# Patient Record
Sex: FEMALE | Race: WHITE | NOT HISPANIC OR LATINO | URBAN - METROPOLITAN AREA
[De-identification: names, ages, dates, MRNs, and addresses within clinical notes are randomized per-mention and may not be internally consistent; named-entity substitution may affect disease eponyms.]

---

## 2018-11-06 ENCOUNTER — INPATIENT (INPATIENT)
Facility: HOSPITAL | Age: 48
LOS: 19 days | Discharge: HOME | DRG: 885 | End: 2018-11-26
Attending: PSYCHIATRY & NEUROLOGY | Admitting: PSYCHIATRY & NEUROLOGY
Payer: COMMERCIAL

## 2018-11-06 VITALS
OXYGEN SATURATION: 100 % | DIASTOLIC BLOOD PRESSURE: 90 MMHG | HEIGHT: 61 IN | HEART RATE: 83 BPM | WEIGHT: 110.01 LBS | RESPIRATION RATE: 18 BRPM | TEMPERATURE: 98 F | SYSTOLIC BLOOD PRESSURE: 131 MMHG

## 2018-11-06 VITALS
SYSTOLIC BLOOD PRESSURE: 94 MMHG | HEART RATE: 83 BPM | DIASTOLIC BLOOD PRESSURE: 62 MMHG | RESPIRATION RATE: 16 BRPM | OXYGEN SATURATION: 100 % | TEMPERATURE: 98 F

## 2018-11-06 DIAGNOSIS — R69 ILLNESS, UNSPECIFIED: ICD-10-CM

## 2018-11-06 DIAGNOSIS — F32.9 MAJOR DEPRESSIVE DISORDER, SINGLE EPISODE, UNSPECIFIED: ICD-10-CM

## 2018-11-06 DIAGNOSIS — F29 UNSPECIFIED PSYCHOSIS NOT DUE TO A SUBSTANCE OR KNOWN PHYSIOLOGICAL CONDITION: ICD-10-CM

## 2018-11-06 LAB
ALBUMIN SERPL ELPH-MCNC: 3.6 G/DL — SIGNIFICANT CHANGE UP (ref 3.3–5)
ALP SERPL-CCNC: 71 U/L — SIGNIFICANT CHANGE UP (ref 30–120)
ALT FLD-CCNC: 78 U/L DA — HIGH (ref 10–60)
AMPHET UR-MCNC: NEGATIVE — SIGNIFICANT CHANGE UP
ANION GAP SERPL CALC-SCNC: 8 MMOL/L — SIGNIFICANT CHANGE UP (ref 5–17)
APAP SERPL-MCNC: <1 UG/ML — LOW (ref 10–30)
APPEARANCE UR: ABNORMAL
AST SERPL-CCNC: 65 U/L — HIGH (ref 10–40)
BACTERIA # UR AUTO: ABNORMAL
BARBITURATES UR SCN-MCNC: NEGATIVE — SIGNIFICANT CHANGE UP
BASOPHILS # BLD AUTO: 0.11 K/UL — SIGNIFICANT CHANGE UP (ref 0–0.2)
BASOPHILS NFR BLD AUTO: 1.4 % — SIGNIFICANT CHANGE UP (ref 0–2)
BENZODIAZ UR-MCNC: NEGATIVE — SIGNIFICANT CHANGE UP
BILIRUB SERPL-MCNC: 0.2 MG/DL — SIGNIFICANT CHANGE UP (ref 0.2–1.2)
BILIRUB UR-MCNC: NEGATIVE — SIGNIFICANT CHANGE UP
BUN SERPL-MCNC: 18 MG/DL — SIGNIFICANT CHANGE UP (ref 7–23)
CALCIUM SERPL-MCNC: 9.5 MG/DL — SIGNIFICANT CHANGE UP (ref 8.4–10.5)
CHLORIDE SERPL-SCNC: 100 MMOL/L — SIGNIFICANT CHANGE UP (ref 96–108)
CO2 SERPL-SCNC: 30 MMOL/L — SIGNIFICANT CHANGE UP (ref 22–31)
COCAINE METAB.OTHER UR-MCNC: NEGATIVE — SIGNIFICANT CHANGE UP
COLOR SPEC: YELLOW — SIGNIFICANT CHANGE UP
CREAT SERPL-MCNC: 1.29 MG/DL — SIGNIFICANT CHANGE UP (ref 0.5–1.3)
DIFF PNL FLD: ABNORMAL
EOSINOPHIL # BLD AUTO: 0.15 K/UL — SIGNIFICANT CHANGE UP (ref 0–0.5)
EOSINOPHIL NFR BLD AUTO: 1.9 % — SIGNIFICANT CHANGE UP (ref 0–6)
EPI CELLS # UR: SIGNIFICANT CHANGE UP
ETHANOL SERPL-MCNC: <3 MG/DL — SIGNIFICANT CHANGE UP (ref 0–3)
GLUCOSE SERPL-MCNC: 91 MG/DL — SIGNIFICANT CHANGE UP (ref 70–99)
GLUCOSE UR QL: NEGATIVE MG/DL — SIGNIFICANT CHANGE UP
HCG UR QL: NEGATIVE — SIGNIFICANT CHANGE UP
HCT VFR BLD CALC: 32.4 % — LOW (ref 34.5–45)
HGB BLD-MCNC: 10.8 G/DL — LOW (ref 11.5–15.5)
IMM GRANULOCYTES NFR BLD AUTO: 0.7 % — SIGNIFICANT CHANGE UP (ref 0–1.5)
KETONES UR-MCNC: NEGATIVE — SIGNIFICANT CHANGE UP
LEUKOCYTE ESTERASE UR-ACNC: ABNORMAL
LYMPHOCYTES # BLD AUTO: 2.13 K/UL — SIGNIFICANT CHANGE UP (ref 1–3.3)
LYMPHOCYTES # BLD AUTO: 26.3 % — SIGNIFICANT CHANGE UP (ref 13–44)
MCHC RBC-ENTMCNC: 30.9 PG — SIGNIFICANT CHANGE UP (ref 27–34)
MCHC RBC-ENTMCNC: 33.3 GM/DL — SIGNIFICANT CHANGE UP (ref 32–36)
MCV RBC AUTO: 92.8 FL — SIGNIFICANT CHANGE UP (ref 80–100)
METHADONE UR-MCNC: NEGATIVE — SIGNIFICANT CHANGE UP
MONOCYTES # BLD AUTO: 0.47 K/UL — SIGNIFICANT CHANGE UP (ref 0–0.9)
MONOCYTES NFR BLD AUTO: 5.8 % — SIGNIFICANT CHANGE UP (ref 2–14)
NEUTROPHILS # BLD AUTO: 5.18 K/UL — SIGNIFICANT CHANGE UP (ref 1.8–7.4)
NEUTROPHILS NFR BLD AUTO: 63.9 % — SIGNIFICANT CHANGE UP (ref 43–77)
NITRITE UR-MCNC: POSITIVE
NRBC # BLD: 0 /100 WBCS — SIGNIFICANT CHANGE UP (ref 0–0)
OPIATES UR-MCNC: NEGATIVE — SIGNIFICANT CHANGE UP
PCP SPEC-MCNC: SIGNIFICANT CHANGE UP
PCP UR-MCNC: NEGATIVE — SIGNIFICANT CHANGE UP
PH UR: 6.5 — SIGNIFICANT CHANGE UP (ref 5–8)
PLATELET # BLD AUTO: 405 K/UL — HIGH (ref 150–400)
POTASSIUM SERPL-MCNC: 3.3 MMOL/L — LOW (ref 3.5–5.3)
POTASSIUM SERPL-SCNC: 3.3 MMOL/L — LOW (ref 3.5–5.3)
PROT SERPL-MCNC: 6.8 G/DL — SIGNIFICANT CHANGE UP (ref 6–8.3)
PROT UR-MCNC: 15 MG/DL
RBC # BLD: 3.49 M/UL — LOW (ref 3.8–5.2)
RBC # FLD: 15.9 % — HIGH (ref 10.3–14.5)
RBC CASTS # UR COMP ASSIST: SIGNIFICANT CHANGE UP /HPF (ref 0–4)
SALICYLATES SERPL-MCNC: <3 MG/DL — SIGNIFICANT CHANGE UP (ref 2.8–20)
SODIUM SERPL-SCNC: 138 MMOL/L — SIGNIFICANT CHANGE UP (ref 135–145)
SP GR SPEC: 1.02 — SIGNIFICANT CHANGE UP (ref 1.01–1.02)
THC UR QL: NEGATIVE — SIGNIFICANT CHANGE UP
UROBILINOGEN FLD QL: NEGATIVE MG/DL — SIGNIFICANT CHANGE UP
WBC # BLD: 8.1 K/UL — SIGNIFICANT CHANGE UP (ref 3.8–10.5)
WBC # FLD AUTO: 8.1 K/UL — SIGNIFICANT CHANGE UP (ref 3.8–10.5)
WBC UR QL: ABNORMAL

## 2018-11-06 PROCEDURE — 93010 ELECTROCARDIOGRAM REPORT: CPT

## 2018-11-06 PROCEDURE — 70450 CT HEAD/BRAIN W/O DYE: CPT | Mod: 26

## 2018-11-06 PROCEDURE — 99285 EMERGENCY DEPT VISIT HI MDM: CPT

## 2018-11-06 RX ORDER — POTASSIUM CHLORIDE 20 MEQ
40 PACKET (EA) ORAL ONCE
Qty: 0 | Refills: 0 | Status: COMPLETED | OUTPATIENT
Start: 2018-11-06 | End: 2018-11-07

## 2018-11-06 RX ORDER — CEFUROXIME AXETIL 250 MG
250 TABLET ORAL EVERY 12 HOURS
Qty: 0 | Refills: 0 | Status: COMPLETED | OUTPATIENT
Start: 2018-11-06 | End: 2018-11-13

## 2018-11-06 NOTE — ED BEHAVIORAL HEALTH ASSESSMENT NOTE - ACTIVATING EVENTS/STRESSORS
Pending incarceration or homelessness/Current or pending isolation/Recent humiliation/shame/Recent losses

## 2018-11-06 NOTE — ED ADULT TRIAGE NOTE - CHIEF COMPLAINT QUOTE
patient is brought in by EMS from [facility], as per EMS, patient stated she likes to hurt herself. patient denies it in triage room. patient is brought in by EMS from hot, as per EMS, patient stated she likes to hurt herself. patient denies it in triage room.    noted band aids on her b/l neck, patient report she fell.

## 2018-11-06 NOTE — ED BEHAVIORAL HEALTH ASSESSMENT NOTE - RISK ASSESSMENT
Risk factors include recent suicidal statements, acute psychosocial stressors, limited social supports, not in psych treatment, and unable to engage in safety planning. At this time pt is at high imminent risk of harm and requires inpt hospitalization for safety and stabilization.

## 2018-11-06 NOTE — ED BEHAVIORAL HEALTH ASSESSMENT NOTE - SUICIDE RISK FACTORS
Perceived burden on family and others/Unable to engage in safety planning/Global insomnia/Mood episode/Agitation/severe anxiety
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
## 2018-11-06 NOTE — ED ADULT NURSE NOTE - CHIEF COMPLAINT QUOTE
patient is brought in by EMS from [facility], as per EMS, patient stated she likes to hurt herself. patient denies it in triage room.

## 2018-11-06 NOTE — ED BEHAVIORAL HEALTH ASSESSMENT NOTE - DESCRIPTION
Per nurse So- pt initially presented to the ED making bizarre statements, had flight of ideas, talking rapidly, verbally agitated, and demanding to leave. She states that after some time pt was able to calm down without PRNs or restraints utilized. She states that pt is AAOx4, conversing with staff and making her needs known. She states that pt wanted to make a phone call and again became agitated when told she cannot at this time, but was able to de-escalate on her own again.    UA +UTI  Utox and serum tox negative   TSH and head CT scan pending  Refused CXR due to concern for radiation    Vital Signs Last 24 Hrs  T(C): 36.7 (06 Nov 2018 17:59), Max: 36.8 (06 Nov 2018 16:30)  T(F): 98.1 (06 Nov 2018 17:59), Max: 98.3 (06 Nov 2018 16:30)  HR: 75 (06 Nov 2018 23:06) (63 - 83)  BP: 107/62 (06 Nov 2018 17:59) (107/62 - 131/90)  BP(mean): --  RR: 16 (06 Nov 2018 17:59) (16 - 18)  SpO2: 98% (06 Nov 2018 17:59) (98% - 100%) see HPI

## 2018-11-06 NOTE — ED BEHAVIORAL HEALTH ASSESSMENT NOTE - DETAILS
Patient is minimally cooperative with evaluation fatigue headache, photophobia will sign out in AM hotel staff aware see above Patient denies,  however she is guarded and minimizing of previous statements made to hotel staff which prompted 911 call

## 2018-11-06 NOTE — ED BEHAVIORAL HEALTH ASSESSMENT NOTE - MEDICAL ISSUES AND PLAN (INCLUDE STANDING AND PRN MEDICATION)
head CT with no acute abnormalities. F/u TSH and start synthroid as appropriate. Start Ceftin 250mg BID for 7 days for UTI. head CT with no acute abnormalities. K+ replacement as needed. F/u TSH and start synthroid as appropriate. Start Ceftin 250mg BID for 7 days for UTI. Start multivitamin.
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
## 2018-11-06 NOTE — ED BEHAVIORAL HEALTH ASSESSMENT NOTE - AXIS IV
Problem related to social environment/Economic problems/Problems with access to healthcare services/Problems with primary support/Housing problems/Occupational problems

## 2018-11-06 NOTE — ED BEHAVIORAL HEALTH ASSESSMENT NOTE - CURRENT MEDICATION
none- supposed to take synthroid but noncompliant none- supposed to take synthroid 175 mcg daily but noncompliant

## 2018-11-06 NOTE — ED ADULT NURSE NOTE - ED STAT RN HAND OFF
NUTRITION    Nutrition Screen      RECOMMENDATIONS / PLAN:     - Once patient is rewarmed, recommend starting enteral nutrition support. Start tube feeding of Glucerna 1.5 at 10 mL/hr and advance as tolerated by 10 mL q 6 hours to goal rate of 60 mL/hr with 150 mL q 6 hour water flushes and discontinue IV fluid once feedings at goal.   - Continue RD inpatient monitoring and evaluation. Goal Regimen: Glucerna 1.5 at 60 mL/hr + 150 mL q 6 hour water flushes to provide: 2160 kcal, 119 gm protein, 108 gm fat, 192 gm CHO, 23 gm fiber, 1093 mL free water, 100% RDIs     NUTRITION INTERVENTIONS & DIAGNOSIS:     [x] IV fluid: NS at 50 mL/hr    [x] Enteral nutrition support: start once rewarmed  [x] Vitamin/mineral supplementation: 1 gm Ca  [x] Coordination of care: discussed during interdisciplinary rounds     Nutrition Diagnosis: Inadequate oral intake related to inability to tolerate po as evidenced by NPO. ASSESSMENT:     7/21: Pt anticipated to reach goal rewarming temperature today, will start feedings once protocol completed per MD.   7/20: Pt intubated on hypothermia protocol- to start rewarming at 15:00 today. OGT to continuous suction.      Average po intake adequate to meet patients estimated nutritional needs:   [] Yes     [x] No   [] Unable to determine at this time    EN infusion adequate to meet patients estimated nutritional needs:   [] Yes     [x] No   [] Unable to determine at this time    Tube Feeding: Glucerna 1.5 at 10 mL/hr via OGT (plan to start once rewarmed)  Water Flushes: 150 mL q 6 hours (plan to start once rewarmed)  Residuals: OGT to continuous suction    Diet: DIET NPO      Food Allergies: NKFA  Current Appetite:   [] Good     [] Fair     [] Poor     [x] Other: NPO  Appetite/meal intake prior to admission:   [] Good     [] Fair     [] Poor     [x] Other: unknown   Feeding Limitations:  [] Swallowing difficulty    [] Chewing difficulty    [x] Other: intubated   Current Meal Intake: No data found.    BM: PTA   Skin Integrity: WDL  Edema: none   Pertinent Medications: Reviewed: insulin drip     Recent Labs      07/21/17   0620  07/20/17   2200  07/20/17   1700   NA  139  141  140   K  4.6  4.1  4.3   CL  107  110*  109*   CO2  21  21  22   GLU  196*  127*  206*   BUN  21*  20*  18   CREA  2.11*  1.63*  1.41*   CA  8.1*  8.4*  8.4*   MG  2.1  2.3  2.1   PHOS  3.9  3.4  2.7   ALB  2.9*  3.1*  3.2*   SGOT  173*  257*  319*   ALT  316*  386*  394*       Intake/Output Summary (Last 24 hours) at 07/21/17 0934  Last data filed at 07/21/17 0800   Gross per 24 hour   Intake          2517.53 ml   Output             1510 ml   Net          1007.53 ml       Anthropometrics:  Ht Readings from Last 1 Encounters:   07/19/17 6' 1\" (1.854 m)     Last 3 Recorded Weights in this Encounter    07/19/17 1005 07/20/17 0400   Weight: 97.1 kg (214 lb) 96.4 kg (212 lb 9.6 oz)     Body mass index is 28.05 kg/(m^2). Weight History:   Weight Metrics 7/20/2017 12/29/2016 8/9/2016 7/28/2016 3/31/2016 3/17/2016 2/5/2016   Weight 212 lb 9.6 oz 215 lb 219 lb 215 lb 218 lb 218 lb 228 lb   BMI 28.05 kg/m2 28.37 kg/m2 29.7 kg/m2 29.15 kg/m2 29.56 kg/m2 29.56 kg/m2 30.92 kg/m2        Admitting Diagnosis: Cardiac arrest [facility])  Pertinent PMHx: CKD, DM, HF    Education Needs:        [x] None identified  [] Identified - Not appropriate at this time  []  Identified and addressed - refer to education log  Learning Limitations:   [] None identified  [x] Identified: intubated     Cultural, Congregational & ethnic food preferences:  [x] None identified    [] Identified and addressed     ESTIMATED NUTRITION NEEDS:     Calories: 7645-9620 kcal ([id number]) based on  [x] Actual BW 96 kg     [] IBW   Protein: 115-192 gm (1.2-2 gm/kg) based on  [x] Actual BW      [] IBW   Fluid: 1 mL/kcal     MONITORING & EVALUATION:     Nutrition Goal(s): goal modified   1.  Nutritional needs will be met through adequate oral intake or nutrition support within the next 7 days.   Outcome:  [] Met/Ongoing    []  Not Met    [x] New/Initial Goal     Monitoring:   [x] EN tolerance   [x] EN infusion   [] Supplement intake   [x] GI symptoms/ability to tolerate po diet   [x] Respiratory status   [x] Plan of care      Previous Recommendations (for follow-up assessments only):     [x]   Implemented       []   Not Implemented (RD to address)     [] No Recommendation Made     Discharge Planning: pending ability to tolerate po and plan of care  [x] Participated in care planning, discharge planning, & interdisciplinary rounds as appropriate      Ann Lynn, [address]    Pager: [phone] Handoff

## 2018-11-06 NOTE — ED BEHAVIORAL HEALTH ASSESSMENT NOTE - PSYCHIATRIC ISSUES AND PLAN (INCLUDE STANDING AND PRN MEDICATION)
Patient refuses standing medications at this time. Primary inpt team to further discuss treatment options with patient. Ativan 2mg PO/IM q6h PRN severe agitation.

## 2018-11-06 NOTE — ED ADULT NURSE NOTE - NSIMPLEMENTINTERV_GEN_ALL_ED
Implemented All Universal Safety Interventions:  [place] to call system. Call bell, personal items and telephone within reach. Instruct patient to call for assistance. Room bathroom lighting operational. Non-slip footwear when patient is off stretcher. Physically safe environment: no spills, clutter or unnecessary equipment. Stretcher in lowest position, wheels locked, appropriate side rails in place.

## 2018-11-07 LAB — TSH SERPL-MCNC: 230.4 UIU/ML — HIGH (ref 0.27–4.2)

## 2018-11-07 PROCEDURE — 99233 SBSQ HOSP IP/OBS HIGH 50: CPT

## 2018-11-07 RX ORDER — ZOLPIDEM TARTRATE 10 MG/1
5 TABLET ORAL AT BEDTIME
Qty: 0 | Refills: 0 | Status: DISCONTINUED | OUTPATIENT
Start: 2018-11-07 | End: 2018-11-13

## 2018-11-07 RX ORDER — LEVOTHYROXINE SODIUM 125 MCG
137 TABLET ORAL DAILY
Qty: 0 | Refills: 0 | Status: DISCONTINUED | OUTPATIENT
Start: 2018-11-07 | End: 2018-11-07

## 2018-11-07 RX ORDER — LEVOTHYROXINE SODIUM 125 MCG
50 TABLET ORAL DAILY
Qty: 0 | Refills: 0 | Status: DISCONTINUED | OUTPATIENT
Start: 2018-11-08 | End: 2018-11-09

## 2018-11-07 RX ORDER — LEVOTHYROXINE SODIUM 125 MCG
25 TABLET ORAL DAILY
Qty: 0 | Refills: 0 | Status: DISCONTINUED | OUTPATIENT
Start: 2018-11-07 | End: 2018-11-07

## 2018-11-07 RX ORDER — ACETAMINOPHEN 500 MG
650 TABLET ORAL EVERY 6 HOURS
Qty: 0 | Refills: 0 | Status: DISCONTINUED | OUTPATIENT
Start: 2018-11-07 | End: 2018-11-22

## 2018-11-07 RX ORDER — LEVOTHYROXINE SODIUM 125 MCG
25 TABLET ORAL ONCE
Qty: 0 | Refills: 0 | Status: COMPLETED | OUTPATIENT
Start: 2018-11-07 | End: 2018-11-07

## 2018-11-07 RX ADMIN — Medication 250 MILLIGRAM(S): at 18:02

## 2018-11-07 RX ADMIN — Medication 25 MICROGRAM(S): at 18:37

## 2018-11-07 RX ADMIN — Medication 40 MILLIEQUIVALENT(S): at 04:28

## 2018-11-07 RX ADMIN — Medication 1 TABLET(S): at 08:50

## 2018-11-07 RX ADMIN — Medication 250 MILLIGRAM(S): at 06:20

## 2018-11-07 NOTE — PROGRESS NOTE BEHAVIORAL HEALTH - NSBHFUPIPCHARTREVFT_PSY_A_CORE
TSH noted to be 230.40 and patient noted to have prolonged QT.   Dr. Delgadillo aware, and Dr. Briceno to follow up with patient.   Abnormal UA, pt was started on Ceftin.

## 2018-11-07 NOTE — PROGRESS NOTE BEHAVIORAL HEALTH - NSBHADDHXPSYCHFT_PSY_A_CORE
patient denies, but Ms Howell reports patient has been declining over the past 2 weeks.  She reports patient has lived at the hospitals for 2 years and "was always a little eccentric" but has gotten worse recently, including making paranoid statements, and reporting hearing things (?AH).  She also reports patient has stated to hospitals staff that if she ran out of money, she would kill herself.  [facility] staff has had questions re: substance use, but have never seen any evidence of this.  Patient denies any hx of psychiatric tx either inpatient or outpatient

## 2018-11-07 NOTE — PROGRESS NOTE BEHAVIORAL HEALTH - NSBHFUPINTERVALHXFT_PSY_A_CORE
Patient is a 67yo MWF (in process of divorce), domiciled alone in a hotel for about 2 years, non caregiver, unemployed who was brought to ED by police due to reportedly telling hotel staff she was suicidal.  Met with and evaluated patient, and case discussed with staff.  No significant interval events are reported.  Patient is pleasant, and is taking care of her ADL this morning.  She denies any SI and reports she does not know why someone would say that.  She denies any substance use. Liver enzymes are slightly elevated. Patient is tangential at times, but participates in conversation.  She minimizes her recent behaviors in the hotel. She denies any SI or HI.  Lab values are significant:  TSH=230.40    UA is abnormal   Prolonged QT.  Patient denies any physical c/o

## 2018-11-07 NOTE — PROGRESS NOTE BEHAVIORAL HEALTH - NSBHADMITIPOBSFT_PSY_A_CORE
Patient is calm and in behavioral control.  Patient denies any SI or HI, and reports she will tell staff if she  has thoughts of harming self or others

## 2018-11-07 NOTE — PROGRESS NOTE BEHAVIORAL HEALTH - OTHER
medical issues reports she is not depressed mild constriction hotel staff reports paranoid statements, no delusions elicited today hotel staff reports some ?AH limited

## 2018-11-08 ENCOUNTER — APPOINTMENT (OUTPATIENT)
Dept: INTERNAL MEDICINE | Facility: CLINIC | Age: 48
End: 2018-11-08

## 2018-11-08 LAB — MAGNESIUM SERPL-MCNC: 2.3 MG/DL — SIGNIFICANT CHANGE UP (ref 1.6–2.6)

## 2018-11-08 PROCEDURE — 99233 SBSQ HOSP IP/OBS HIGH 50: CPT

## 2018-11-08 PROCEDURE — 93306 TTE W/DOPPLER COMPLETE: CPT | Mod: 26

## 2018-11-08 RX ORDER — ARIPIPRAZOLE 15 MG/1
5 TABLET ORAL DAILY
Qty: 0 | Refills: 0 | Status: DISCONTINUED | OUTPATIENT
Start: 2018-11-08 | End: 2018-11-09

## 2018-11-08 RX ORDER — DIPHENHYDRAMINE HCL 50 MG
50 CAPSULE ORAL EVERY 6 HOURS
Qty: 0 | Refills: 0 | Status: DISCONTINUED | OUTPATIENT
Start: 2018-11-08 | End: 2018-11-26

## 2018-11-08 RX ADMIN — Medication 1 TABLET(S): at 08:54

## 2018-11-08 RX ADMIN — Medication 50 MICROGRAM(S): at 06:25

## 2018-11-08 RX ADMIN — Medication 250 MILLIGRAM(S): at 08:54

## 2018-11-08 RX ADMIN — Medication 250 MILLIGRAM(S): at 21:04

## 2018-11-08 NOTE — PROGRESS NOTE BEHAVIORAL HEALTH - OTHER
reports she is not depressed but has constricted affect, at times odd mild constriction guarded in general; alludes to paranoid ideation cannot rule out at this time even though she denies looks older than stated age; some fillers/lip enhancement noted relates oddly and in a limited manner limited

## 2018-11-08 NOTE — PROGRESS NOTE BEHAVIORAL HEALTH - NSBHFUPINTERVALHXFT_PSY_A_CORE
Patient is adjusting to the Unit well thus far; attending group and she relates oddly and perseverates to the point that she has to be redirected. Patient endorses paranoid-themed thinking including not saying certain things "so they can't find me" and is unable to give a linear narrative and history. Her Synthroid was increased further from 25mcg po qd to 50mcg po qd given her hx of hypothyroidism, and very high TSH due to noncompliance. Patient is adjusting to the Unit well thus far; attending group and she relates oddly and in a limited manner. Patient perseverates to the point that she has to be redirected. Patient endorses paranoid-themed thinking including not saying certain things "so they can't find me" and is unable to give a linear narrative and history. Her Synthroid was increased further from 25mcg po qd to 50mcg po qd given her hx of hypothyroidism, and very high TSH due to noncompliance.

## 2018-11-08 NOTE — PROGRESS NOTE BEHAVIORAL HEALTH - NSBHADMITMEDEDUDETAILS_A_CORE FT
start Abilify 5mg PO qd (long half life so daily administration is preferred for otherwise unwilling patient; not too strong antipsychotic in case Pt is antipsychotic naive; also has mood stabilizing effect just in case there is a Bipolar spectrum disorder present in this case) + PRNs Psychoeducation done re: need for Rx compliance for sx management with patient saying "I don't need Rx"

## 2018-11-08 NOTE — PROGRESS NOTE BEHAVIORAL HEALTH - NSBHADMITIPOBSFT_PSY_A_CORE
thus far has been calm thus far has been calm..Denies any SI or HI and reports will tell staff if she has SI or HI

## 2018-11-08 NOTE — PROGRESS NOTE BEHAVIORAL HEALTH - OTHER
reports she is not depressed mild constriction hotel staff reports paranoid statements, no delusions elicited today hotel staff reports some ?AH limited looks older than stated age; some fillers/lip enhancement noted relates oddly and in a limited manner reports she is not depressed but has constricted affect, at times odd guarded in general; alludes to paranoid ideation cannot rule out at this time even though she denies

## 2018-11-08 NOTE — PROGRESS NOTE BEHAVIORAL HEALTH - NSBHFUPINTERVALHXFT_PSY_A_CORE
Met with and evaluated patient, and case discussed in tx team meeting.  No significant interval events are reported except patient is refusing Abilify.  She reports she does not want any psych Rx.  Patient is pleasant, and in behavioral control, but she is tangential, and has paranoid thoughts about her twin sister.  Patient describes being in leg cast as a child to straighten her legs and how difficult this was for her.  Denies any SI or HI.  No Rx SE are noted or reported. Met with and evaluated patient, and case discussed in tx team meeting.  No significant interval events are reported except patient is refusing Abilify.  She reports she does not want any psych Rx.  Patient is pleasant, and in behavioral control, but she is tangential, and has some ? paranoid thoughts about her twin sister.  Patient describes being in leg cast as a child to straighten her legs and how difficult this was for her.  Denies any SI or HI.  No Rx SE are noted or reported.

## 2018-11-08 NOTE — PROGRESS NOTE BEHAVIORAL HEALTH - NSBHADMITMEDEDUDETAILS_A_CORE FT
start Abilify 5mg PO qd (long half life so daily administration is preferred for otherwise unwilling patient; not too strong antipsychotic in case Pt is antipsychotic naive; also has mood stabilizing effect just in case there is a Bipolar spectrum disorder present in this case) + PRNs

## 2018-11-09 DIAGNOSIS — F31.9 BIPOLAR DISORDER, UNSPECIFIED: ICD-10-CM

## 2018-11-09 PROCEDURE — 99233 SBSQ HOSP IP/OBS HIGH 50: CPT

## 2018-11-09 PROCEDURE — 93010 ELECTROCARDIOGRAM REPORT: CPT

## 2018-11-09 RX ORDER — LEVOTHYROXINE SODIUM 125 MCG
75 TABLET ORAL DAILY
Qty: 0 | Refills: 0 | Status: DISCONTINUED | OUTPATIENT
Start: 2018-11-09 | End: 2018-11-13

## 2018-11-09 RX ORDER — OLANZAPINE 15 MG/1
5 TABLET, FILM COATED ORAL EVERY 6 HOURS
Qty: 0 | Refills: 0 | Status: DISCONTINUED | OUTPATIENT
Start: 2018-11-09 | End: 2018-11-26

## 2018-11-09 RX ORDER — OLANZAPINE 15 MG/1
5 TABLET, FILM COATED ORAL EVERY 6 HOURS
Qty: 0 | Refills: 0 | Status: DISCONTINUED | OUTPATIENT
Start: 2018-11-09 | End: 2018-11-09

## 2018-11-09 RX ORDER — DIPHENHYDRAMINE HCL 50 MG
50 CAPSULE ORAL EVERY 6 HOURS
Qty: 0 | Refills: 0 | Status: DISCONTINUED | OUTPATIENT
Start: 2018-11-09 | End: 2018-11-26

## 2018-11-09 RX ORDER — LACTOBACILLUS ACIDOPHILUS 100MM CELL
1 CAPSULE ORAL DAILY
Qty: 0 | Refills: 0 | Status: DISCONTINUED | OUTPATIENT
Start: 2018-11-09 | End: 2018-11-26

## 2018-11-09 RX ORDER — ARIPIPRAZOLE 15 MG/1
5 TABLET ORAL DAILY
Qty: 0 | Refills: 0 | Status: DISCONTINUED | OUTPATIENT
Start: 2018-11-09 | End: 2018-11-12

## 2018-11-09 RX ORDER — DIVALPROEX SODIUM 500 MG/1
250 TABLET, DELAYED RELEASE ORAL
Qty: 0 | Refills: 0 | Status: DISCONTINUED | OUTPATIENT
Start: 2018-11-09 | End: 2018-11-15

## 2018-11-09 RX ORDER — CHOLECALCIFEROL (VITAMIN D3) 125 MCG
400 CAPSULE ORAL DAILY
Qty: 0 | Refills: 0 | Status: DISCONTINUED | OUTPATIENT
Start: 2018-11-09 | End: 2018-11-26

## 2018-11-09 RX ADMIN — Medication 250 MILLIGRAM(S): at 09:55

## 2018-11-09 RX ADMIN — Medication 2 MILLIGRAM(S): at 14:06

## 2018-11-09 RX ADMIN — DIVALPROEX SODIUM 250 MILLIGRAM(S): 500 TABLET, DELAYED RELEASE ORAL at 20:43

## 2018-11-09 RX ADMIN — Medication 650 MILLIGRAM(S): at 06:50

## 2018-11-09 RX ADMIN — Medication 1 TABLET(S): at 16:39

## 2018-11-09 RX ADMIN — Medication 5 MILLIGRAM(S): at 20:43

## 2018-11-09 RX ADMIN — Medication 250 MILLIGRAM(S): at 20:43

## 2018-11-09 RX ADMIN — ARIPIPRAZOLE 5 MILLIGRAM(S): 15 TABLET ORAL at 15:40

## 2018-11-09 RX ADMIN — Medication 50 MICROGRAM(S): at 05:09

## 2018-11-09 RX ADMIN — Medication 650 MILLIGRAM(S): at 05:50

## 2018-11-09 RX ADMIN — Medication 1 TABLET(S): at 09:55

## 2018-11-09 NOTE — PROGRESS NOTE BEHAVIORAL HEALTH - NSBHADMITMEDEDUDETAILS_A_CORE FT
as per Patient's request, ordered vitamins and supplements; encouraged her to take Abilify. Plan to go for medication Over Objection if Patient continues to refuse treatment. as per Patient's request, ordered vitamins and supplements; due to EKG QTC - holding antipsychotics for now; repeat EKG ordered. encouraged her to take depakote 250mg PO bid. Plan to go for medication Over Objection if Patient continues to refuse treatment. as per Patient's request, ordered vitamins and supplements; repeated EKG due to elevated QTc which returned WNL with QTc < 450; Patient ordered Abilify and depakote - she can choose which to take. Plan to go for medication Over Objection if Patient continues to refuse treatment.

## 2018-11-09 NOTE — PROGRESS NOTE BEHAVIORAL HEALTH - OTHER
looks older than stated age; some fillers/lip enhancement noted relates in an elated, at times flirtatious manner that is not appropriate to context adequate thus far talkative but not pressured "I am great. I am ready to leave today and I can come back to see you" elated, at times tearful and labile guarded in general; alludes to paranoid ideation cannot rule out at this time even though she denies limited

## 2018-11-09 NOTE — PROGRESS NOTE BEHAVIORAL HEALTH - NSBHFUPINTERVALHXFT_PSY_A_CORE
Patient sat with Writer for a lengthy conversation which was difficult to follow due to Patient's thought disorganization,  tangentiality and flight of ideas. 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 patient unable to give a concise history - did elicit that she has tried club drugs before in the past; she has used Xanax after her plastic surgery for sleep and various pain medications. 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 Risks/benefits/alternatives was discussed and Patient was given several options regarding medications to take and the reasoning was explained why she would need them. She refused and said she will not take any psychiatric medications as 'I don't need them" and then said she will contact her  friend who she is supposedly planing on moving in with. Patient was informed that she needs to contact the Mental Hygiene  assigned to represent [facility] and number was provided for her. Moreover, Patient's involuntary legal status was explained to her as well as the plan to take her to Court for Medication Over Objection which will take > 1 week etc. Patient was also informed that if she dos take medications voluntarily, her discharge would be much sooner. Patient then kept saying "I know I sounds crazy" at time; then followed Writer out of the room saying "where are you going beautiful" and was coquettish to the point that Writer had to retreat to her office.

## 2018-11-10 RX ADMIN — Medication 1 TABLET(S): at 09:04

## 2018-11-10 RX ADMIN — Medication 400 UNIT(S): at 09:04

## 2018-11-10 RX ADMIN — DIVALPROEX SODIUM 250 MILLIGRAM(S): 500 TABLET, DELAYED RELEASE ORAL at 09:04

## 2018-11-10 RX ADMIN — ARIPIPRAZOLE 5 MILLIGRAM(S): 15 TABLET ORAL at 09:04

## 2018-11-10 RX ADMIN — Medication 50 MICROGRAM(S): at 07:22

## 2018-11-10 RX ADMIN — Medication 250 MILLIGRAM(S): at 09:04

## 2018-11-10 RX ADMIN — DIVALPROEX SODIUM 250 MILLIGRAM(S): 500 TABLET, DELAYED RELEASE ORAL at 20:44

## 2018-11-10 RX ADMIN — Medication 250 MILLIGRAM(S): at 20:45

## 2018-11-10 NOTE — PROGRESS NOTE BEHAVIORAL HEALTH - OTHER
relates in an elated, at times flirtatious manner that is not appropriate to context adequate thus far talkative but not pressured "I am great. I am ready to leave today and I can come back to see you" elated, guarded in general; alludes to paranoid ideation cannot rule out at this time even though she denies limited looks older than stated age; some fillers/lip enhancement noted

## 2018-11-10 NOTE — PROGRESS NOTE BEHAVIORAL HEALTH - NSBHFUPINTERVALHXFT_PSY_A_CORE
Patient seen, evaluated and chart reviewed. Patient presents with total disorganization and pressured and circumstantial speech. Patient unable to provide linear history of events leading her to the hospitalization. She is clearly manic and has ambivalent attitude toward pharmacotherapy.  She appears to be somewhat disorganized and unable to engage in logical conversation.

## 2018-11-10 NOTE — PROGRESS NOTE BEHAVIORAL HEALTH - NSBHADMITMEDEDUDETAILS_A_CORE FT
as per Patient's request, ordered vitamins and supplements; repeated EKG due to elevated QTc which returned WNL with QTc < 450; Patient ordered Abilify and depakote - she can choose which to take. Plan to go for medication Over Objection if Patient continues to refuse treatment.

## 2018-11-11 RX ADMIN — Medication 400 UNIT(S): at 08:52

## 2018-11-11 RX ADMIN — Medication 75 MICROGRAM(S): at 05:48

## 2018-11-11 RX ADMIN — Medication 650 MILLIGRAM(S): at 01:56

## 2018-11-11 RX ADMIN — DIVALPROEX SODIUM 250 MILLIGRAM(S): 500 TABLET, DELAYED RELEASE ORAL at 21:09

## 2018-11-11 RX ADMIN — Medication 250 MILLIGRAM(S): at 21:09

## 2018-11-11 RX ADMIN — DIVALPROEX SODIUM 250 MILLIGRAM(S): 500 TABLET, DELAYED RELEASE ORAL at 08:52

## 2018-11-11 RX ADMIN — ARIPIPRAZOLE 5 MILLIGRAM(S): 15 TABLET ORAL at 08:52

## 2018-11-11 RX ADMIN — Medication 250 MILLIGRAM(S): at 08:52

## 2018-11-11 RX ADMIN — Medication 1 TABLET(S): at 08:52

## 2018-11-11 NOTE — PROGRESS NOTE BEHAVIORAL HEALTH - NSBHFUPINTERVALHXFT_PSY_A_CORE
Patient seen, evaluated and chart reviewed. Patient presents with total disorganization and pressured and circumstantial speech. Patient unable to provide linear history of events leading her to the hospitalization. She is clearly manic and has ambivalent attitude toward pharmacotherapy.  She appears to be somewhat disorganized and unable to engage in logical conversation. Remains flirtatious and somewhat sexually preoccupied.

## 2018-11-11 NOTE — PROGRESS NOTE BEHAVIORAL HEALTH - OTHER
looks older than stated age; some fillers/lip enhancement noted relates in an elated, at times flirtatious manner that is not appropriate to context limited adequate thus far talkative but not pressured "I am great. I am ready to leave today and I can come back to see you" elated, guarded in general; alludes to paranoid ideation cannot rule out at this time even though she denies

## 2018-11-12 LAB
ANION GAP SERPL CALC-SCNC: 8 MMOL/L — SIGNIFICANT CHANGE UP (ref 5–17)
BUN SERPL-MCNC: 17 MG/DL — SIGNIFICANT CHANGE UP (ref 7–23)
CALCIUM SERPL-MCNC: 8.6 MG/DL — SIGNIFICANT CHANGE UP (ref 8.4–10.5)
CHLORIDE SERPL-SCNC: 105 MMOL/L — SIGNIFICANT CHANGE UP (ref 96–108)
CO2 SERPL-SCNC: 27 MMOL/L — SIGNIFICANT CHANGE UP (ref 22–31)
CREAT SERPL-MCNC: 0.88 MG/DL — SIGNIFICANT CHANGE UP (ref 0.5–1.3)
GLUCOSE SERPL-MCNC: 86 MG/DL — SIGNIFICANT CHANGE UP (ref 70–99)
HCT VFR BLD CALC: 34.1 % — LOW (ref 34.5–45)
HGB BLD-MCNC: 11.1 G/DL — LOW (ref 11.5–15.5)
IRON SATN MFR SERPL: 10 % — LOW (ref 14–50)
IRON SATN MFR SERPL: 36 UG/DL — SIGNIFICANT CHANGE UP (ref 30–160)
MCHC RBC-ENTMCNC: 30.9 PG — SIGNIFICANT CHANGE UP (ref 27–34)
MCHC RBC-ENTMCNC: 32.6 GM/DL — SIGNIFICANT CHANGE UP (ref 32–36)
MCV RBC AUTO: 95 FL — SIGNIFICANT CHANGE UP (ref 80–100)
NRBC # BLD: 0 /100 WBCS — SIGNIFICANT CHANGE UP (ref 0–0)
PLATELET # BLD AUTO: 339 K/UL — SIGNIFICANT CHANGE UP (ref 150–400)
POTASSIUM SERPL-MCNC: 4.5 MMOL/L — SIGNIFICANT CHANGE UP (ref 3.5–5.3)
POTASSIUM SERPL-SCNC: 4.5 MMOL/L — SIGNIFICANT CHANGE UP (ref 3.5–5.3)
RBC # BLD: 3.59 M/UL — LOW (ref 3.8–5.2)
RBC # FLD: 16.5 % — HIGH (ref 10.3–14.5)
SODIUM SERPL-SCNC: 140 MMOL/L — SIGNIFICANT CHANGE UP (ref 135–145)
TIBC SERPL-MCNC: 367 UG/DL — SIGNIFICANT CHANGE UP (ref 220–430)
TSH SERPL-MCNC: 223.5 UIU/ML — HIGH (ref 0.27–4.2)
TSH SERPL-MCNC: 224.3 UIU/ML — HIGH (ref 0.27–4.2)
UIBC SERPL-MCNC: 331 UG/DL — SIGNIFICANT CHANGE UP (ref 110–370)
WBC # BLD: 5.85 K/UL — SIGNIFICANT CHANGE UP (ref 3.8–10.5)
WBC # FLD AUTO: 5.85 K/UL — SIGNIFICANT CHANGE UP (ref 3.8–10.5)

## 2018-11-12 PROCEDURE — 99232 SBSQ HOSP IP/OBS MODERATE 35: CPT

## 2018-11-12 RX ORDER — ARIPIPRAZOLE 15 MG/1
10 TABLET ORAL DAILY
Qty: 0 | Refills: 0 | Status: DISCONTINUED | OUTPATIENT
Start: 2018-11-12 | End: 2018-11-14

## 2018-11-12 RX ORDER — FUROSEMIDE 40 MG
20 TABLET ORAL DAILY
Qty: 0 | Refills: 0 | Status: COMPLETED | OUTPATIENT
Start: 2018-11-12 | End: 2018-11-14

## 2018-11-12 RX ADMIN — Medication 250 MILLIGRAM(S): at 20:38

## 2018-11-12 RX ADMIN — Medication 20 MILLIGRAM(S): at 14:10

## 2018-11-12 RX ADMIN — Medication 650 MILLIGRAM(S): at 20:42

## 2018-11-12 RX ADMIN — ARIPIPRAZOLE 5 MILLIGRAM(S): 15 TABLET ORAL at 11:19

## 2018-11-12 RX ADMIN — Medication 5 MILLIGRAM(S): at 14:10

## 2018-11-12 RX ADMIN — Medication 1 TABLET(S): at 11:18

## 2018-11-12 RX ADMIN — Medication 75 MICROGRAM(S): at 06:29

## 2018-11-12 RX ADMIN — Medication 400 UNIT(S): at 11:19

## 2018-11-12 RX ADMIN — Medication 650 MILLIGRAM(S): at 21:40

## 2018-11-12 RX ADMIN — Medication 250 MILLIGRAM(S): at 11:19

## 2018-11-12 RX ADMIN — DIVALPROEX SODIUM 250 MILLIGRAM(S): 500 TABLET, DELAYED RELEASE ORAL at 20:38

## 2018-11-12 RX ADMIN — DIVALPROEX SODIUM 250 MILLIGRAM(S): 500 TABLET, DELAYED RELEASE ORAL at 11:19

## 2018-11-12 RX ADMIN — Medication 1 TABLET(S): at 11:19

## 2018-11-12 NOTE — PROGRESS NOTE BEHAVIORAL HEALTH - NSBHFUPINTERVALHXFT_PSY_A_CORE
Patient seen, evaluated and chart reviewed. Case discussed in tx team meeting. Patient presents with somewhat less disorganized speech, and less tangential.  Speech rate and rhythm is WNL.  Patient unable to provide linear history of events leading her to the hospitalization. She is less manic and has ambivalent attitude toward pharmacotherapy, but does take the Rx.  She appears to be somewhat disorganized and has some difficulty engaging in logical conversation. No Rx SE or sx TD/EPS are noted or reported.  Increase Abilify to 10mg qam.  Denies any SI or HI.  Denies any AH or VH

## 2018-11-12 NOTE — PROGRESS NOTE BEHAVIORAL HEALTH - NSBHADMITMEDEDUDETAILS_A_CORE FT
Patient is tolerating Abilify well, Abilify increased to 10mg.  Patient teaching done re: increase in Abilify with patient saying ok

## 2018-11-12 NOTE — PROGRESS NOTE BEHAVIORAL HEALTH - OTHER
looks older than stated age; some fillers/lip enhancement noted relates in an elated, at times flirtatious manner that is not appropriate to context adequate thus far talkative but not pressured " really good" elated, less disorganized, less tangential guarded in general; alludes to paranoid ideation cannot rule out at this time even though she denies limited to poor

## 2018-11-13 PROCEDURE — 99232 SBSQ HOSP IP/OBS MODERATE 35: CPT

## 2018-11-13 PROCEDURE — 74018 RADEX ABDOMEN 1 VIEW: CPT | Mod: 26

## 2018-11-13 PROCEDURE — 12345: CPT | Mod: NC

## 2018-11-13 RX ORDER — LEVOTHYROXINE SODIUM 125 MCG
200 TABLET ORAL DAILY
Qty: 0 | Refills: 0 | Status: DISCONTINUED | OUTPATIENT
Start: 2018-11-13 | End: 2018-11-20

## 2018-11-13 RX ORDER — POLYETHYLENE GLYCOL 3350 17 G/17G
17 POWDER, FOR SOLUTION ORAL DAILY
Qty: 0 | Refills: 0 | Status: DISCONTINUED | OUTPATIENT
Start: 2018-11-13 | End: 2018-11-22

## 2018-11-13 RX ORDER — OXYCODONE AND ACETAMINOPHEN 5; 325 MG/1; MG/1
1 TABLET ORAL ONCE
Qty: 0 | Refills: 0 | Status: DISCONTINUED | OUTPATIENT
Start: 2018-11-13 | End: 2018-11-13

## 2018-11-13 RX ORDER — ZOLPIDEM TARTRATE 10 MG/1
5 TABLET ORAL AT BEDTIME
Qty: 0 | Refills: 0 | Status: DISCONTINUED | OUTPATIENT
Start: 2018-11-13 | End: 2018-11-19

## 2018-11-13 RX ORDER — AMOXICILLIN 250 MG/5ML
500 SUSPENSION, RECONSTITUTED, ORAL (ML) ORAL
Qty: 0 | Refills: 0 | Status: COMPLETED | OUTPATIENT
Start: 2018-11-13 | End: 2018-11-18

## 2018-11-13 RX ORDER — MULTIVIT WITH MIN/MFOLATE/K2 340-15/3 G
150 POWDER (GRAM) ORAL ONCE
Qty: 0 | Refills: 0 | Status: COMPLETED | OUTPATIENT
Start: 2018-11-13 | End: 2018-11-14

## 2018-11-13 RX ADMIN — ARIPIPRAZOLE 10 MILLIGRAM(S): 15 TABLET ORAL at 09:19

## 2018-11-13 RX ADMIN — Medication 650 MILLIGRAM(S): at 03:11

## 2018-11-13 RX ADMIN — Medication 500 MILLIGRAM(S): at 22:48

## 2018-11-13 RX ADMIN — Medication 400 UNIT(S): at 09:19

## 2018-11-13 RX ADMIN — DIVALPROEX SODIUM 250 MILLIGRAM(S): 500 TABLET, DELAYED RELEASE ORAL at 21:26

## 2018-11-13 RX ADMIN — Medication 650 MILLIGRAM(S): at 22:00

## 2018-11-13 RX ADMIN — Medication 250 MILLIGRAM(S): at 21:26

## 2018-11-13 RX ADMIN — Medication 650 MILLIGRAM(S): at 21:26

## 2018-11-13 RX ADMIN — Medication 75 MICROGRAM(S): at 05:47

## 2018-11-13 RX ADMIN — Medication 1 TABLET(S): at 09:19

## 2018-11-13 RX ADMIN — Medication 20 MILLIGRAM(S): at 09:19

## 2018-11-13 RX ADMIN — Medication 650 MILLIGRAM(S): at 04:00

## 2018-11-13 RX ADMIN — POLYETHYLENE GLYCOL 3350 17 GRAM(S): 17 POWDER, FOR SOLUTION ORAL at 21:27

## 2018-11-13 RX ADMIN — OXYCODONE AND ACETAMINOPHEN 1 TABLET(S): 5; 325 TABLET ORAL at 22:48

## 2018-11-13 RX ADMIN — Medication 250 MILLIGRAM(S): at 09:19

## 2018-11-13 RX ADMIN — OXYCODONE AND ACETAMINOPHEN 1 TABLET(S): 5; 325 TABLET ORAL at 23:20

## 2018-11-13 RX ADMIN — DIVALPROEX SODIUM 250 MILLIGRAM(S): 500 TABLET, DELAYED RELEASE ORAL at 09:19

## 2018-11-13 NOTE — CHART NOTE - NSCHARTNOTEFT_GEN_A_CORE
Called to see patient because of diffuse body pain.  Patient states she feels very swollen everywhere and consequently has pain throughout.  Has been in constant pain since admission and the patient has been wearing ROLO stockings but took them off tonight and the pain intensified.  Patient with difficulty with movement because of her pain.  Has been taking the prescribed tylenol without any relief (was taking percocets as an outpatient).    Patient also complained of some decrease hearing in her right ear as well.      VS:  /64   HR 66    RR 17    T 97.5F    GENERAL:     NAD  HEAD:     atraumatic, normocephalic  EYES:     EOMI, conjunctiva and sclera clear  ENMT:     right ear with possible whitish discharge in canal, TM not clearly visualized  NECK:     supple, no JVD  RESPIRATORY:     clear to auscultation bilaterally, no rales or rhonchi or wheezing or rubs  CARDIOVASCULAR:     regular rate and rhythm, no murmurs or rubs or gallops, 2+ peripheral pulses  GASTROINTESTINAL:     soft, nontender, nondistended, no hepatosplenomegaly palpated, bowel sounds present  EXTREMITIES:     diffuse 3+ non-pitting edema in BLE and 2+ non-pitting edema in BUE  MUSCULOSKELETAL:     diffuse joint pain  NERVOUS SYSTEM:     motor strength intact with 5/5 B/L upper and lower extremities, no gross sensory deficits  SKIN:     some excoriations on right foot  PSYCH:     slowed and delayed responses but appropriate    A/P:      1)  Body swelling - Patient complaining of diffuse body swelling, likely 2/2 her severe hypothyroidism causing myxedema.  Her recent TTE showed a normal LV systolic and diastolic function with a EF of 60%, making CHF unlikely (also has no crackles on exam).  Also unlikely to be a DVT since patient has diffuse body swelling and not just one isolated extremity swelling (and patient has been ambulating).      - patient just started on an increase of synthroid starting tomorrow -> consider an endocrine consult as well   - would hold off any diuretics for now  - pain control (ordered percocet x1)    2)  Ear discharge - possibly suppurative otitis media  - ordered amoxicillin 500mg BID for 5 days  - f/u as needed Called to see patient because of diffuse body pain.  Patient states she feels very swollen everywhere and consequently has pain throughout.  Has been in constant pain since admission and the patient has been wearing ROLO stockings but took them off tonight and the pain intensified.  Patient with difficulty with movement because of her pain.  Has been taking the prescribed tylenol without any relief (was taking percocets as an outpatient).    Patient also complained of some decrease hearing in her right ear as well.      PAST MEDICAL & SURGICAL HISTORY:  Ankle pain, left  Acne  Hypothyroid  S/P breast implant, saline: 2002  S/P hernia repair: umbilical, 2009    VS:  /64   HR 66    RR 17    T 97.5F    GENERAL:     NAD  HEAD:     atraumatic, normocephalic  EYES:     EOMI, conjunctiva and sclera clear  ENMT:     right ear with possible whitish discharge in canal, TM not clearly visualized  NECK:     supple, no JVD  RESPIRATORY:     clear to auscultation bilaterally, no rales or rhonchi or wheezing or rubs  CARDIOVASCULAR:     regular rate and rhythm, no murmurs or rubs or gallops, 2+ peripheral pulses  GASTROINTESTINAL:     soft, nontender, nondistended, no hepatosplenomegaly palpated, bowel sounds present  EXTREMITIES:     diffuse 3+ non-pitting edema in BLE and 2+ non-pitting edema in BUE  MUSCULOSKELETAL:     diffuse joint pain  NERVOUS SYSTEM:     motor strength intact with 5/5 B/L upper and lower extremities, no gross sensory deficits  SKIN:     some excoriations on right foot  PSYCH:     slowed and delayed responses but appropriate    TSH - 224  TTE - < from: US Transthoracic Echocardiogram w/Doppler Complete (11.08.18 @ 09:39) >    SUMMARY:  1. normal right and left ventricular systolic function  2. mild mitral regurgitation  3. trace tricuspid regurgitation with normal right femur systolic   pressure.  4. Aortic sclerosis.    < end of copied text >      A/P:      1)  Body swelling - Patient complaining of diffuse body swelling, likely 2/2 her severe hypothyroidism causing myxedema.  Her recent TTE showed a normal LV systolic and diastolic function with a EF of 60%, making CHF unlikely (also has no crackles on exam).  Also unlikely to be a DVT since patient has diffuse body swelling and not just one isolated extremity swelling (and patient has been ambulating).      - patient just started on an increase of synthroid starting tomorrow -> consider an endocrine consult as well   - would hold off any diuretics for now  - pain control (ordered percocet x1)    2)  Ear discharge - possibly suppurative otitis media  - ordered amoxicillin 500mg BID for 5 days  - f/u as needed

## 2018-11-13 NOTE — PROGRESS NOTE BEHAVIORAL HEALTH - NSBHFUPINTERVALHXFT_PSY_A_CORE
Patient seen, evaluated and chart reviewed. Case discussed in tx team meeting. Patient presents with somewhat less disorganized speech, and less tangential.  Speech rate and rhythm is WNL.  Patient unable to provide linear history of events leading her to the hospitalization. She is less manic and has ambivalent attitude toward pharmacotherapy, but does take the Rx.  She appears to be somewhat disorganized and has some difficulty engaging in logical conversation. Patient is expressing frequent paranoid ideation.  No Rx SE or sx TD/EPS are noted or reported.  Tolerating increase in Abilify well. c/o constipation, and abdominal xray done. Dr. Valdez to follow up with patient. Denies any SI or HI.  Denies any AH or VH

## 2018-11-13 NOTE — PROGRESS NOTE BEHAVIORAL HEALTH - OTHER
looks older than stated age; some fillers/lip enhancement noted relates in an elated, at times flirtatious manner that is not appropriate to context adequate thus far talkative but not pressured " very good", occasional irritability elated, less disorganized, less tangential guarded in general; very paranoid today cannot rule out at this time even though she denies limited to poor

## 2018-11-14 LAB
ANION GAP SERPL CALC-SCNC: 6 MMOL/L — SIGNIFICANT CHANGE UP (ref 5–17)
BUN SERPL-MCNC: 21 MG/DL — SIGNIFICANT CHANGE UP (ref 7–23)
CALCIUM SERPL-MCNC: 9.3 MG/DL — SIGNIFICANT CHANGE UP (ref 8.4–10.5)
CHLORIDE SERPL-SCNC: 100 MMOL/L — SIGNIFICANT CHANGE UP (ref 96–108)
CO2 SERPL-SCNC: 33 MMOL/L — HIGH (ref 22–31)
CREAT SERPL-MCNC: 0.91 MG/DL — SIGNIFICANT CHANGE UP (ref 0.5–1.3)
GLUCOSE SERPL-MCNC: 73 MG/DL — SIGNIFICANT CHANGE UP (ref 70–99)
POTASSIUM SERPL-MCNC: 4.3 MMOL/L — SIGNIFICANT CHANGE UP (ref 3.5–5.3)
POTASSIUM SERPL-SCNC: 4.3 MMOL/L — SIGNIFICANT CHANGE UP (ref 3.5–5.3)
SODIUM SERPL-SCNC: 139 MMOL/L — SIGNIFICANT CHANGE UP (ref 135–145)

## 2018-11-14 PROCEDURE — 93970 EXTREMITY STUDY: CPT | Mod: 26

## 2018-11-14 PROCEDURE — 99233 SBSQ HOSP IP/OBS HIGH 50: CPT

## 2018-11-14 RX ORDER — SODIUM CHLORIDE 0.65 %
1 AEROSOL, SPRAY (ML) NASAL EVERY 6 HOURS
Qty: 0 | Refills: 0 | Status: DISCONTINUED | OUTPATIENT
Start: 2018-11-14 | End: 2018-11-26

## 2018-11-14 RX ORDER — CIPROFLOXACIN HCL 0.3 %
5 DROPS OPHTHALMIC (EYE)
Qty: 0 | Refills: 0 | Status: COMPLETED | OUTPATIENT
Start: 2018-11-14 | End: 2018-11-21

## 2018-11-14 RX ORDER — ARIPIPRAZOLE 15 MG/1
15 TABLET ORAL DAILY
Qty: 0 | Refills: 0 | Status: DISCONTINUED | OUTPATIENT
Start: 2018-11-14 | End: 2018-11-17

## 2018-11-14 RX ADMIN — Medication 1 TABLET(S): at 09:22

## 2018-11-14 RX ADMIN — Medication 150 MILLILITER(S): at 06:22

## 2018-11-14 RX ADMIN — Medication 400 UNIT(S): at 09:21

## 2018-11-14 RX ADMIN — DIVALPROEX SODIUM 250 MILLIGRAM(S): 500 TABLET, DELAYED RELEASE ORAL at 21:14

## 2018-11-14 RX ADMIN — Medication 5 DROP(S): at 21:13

## 2018-11-14 RX ADMIN — Medication 20 MILLIGRAM(S): at 09:21

## 2018-11-14 RX ADMIN — Medication 500 MILLIGRAM(S): at 09:21

## 2018-11-14 RX ADMIN — DIVALPROEX SODIUM 250 MILLIGRAM(S): 500 TABLET, DELAYED RELEASE ORAL at 09:21

## 2018-11-14 RX ADMIN — Medication 1 TABLET(S): at 09:21

## 2018-11-14 RX ADMIN — Medication 200 MICROGRAM(S): at 06:22

## 2018-11-14 RX ADMIN — Medication 500 MILLIGRAM(S): at 21:14

## 2018-11-14 RX ADMIN — ARIPIPRAZOLE 10 MILLIGRAM(S): 15 TABLET ORAL at 09:21

## 2018-11-14 RX ADMIN — POLYETHYLENE GLYCOL 3350 17 GRAM(S): 17 POWDER, FOR SOLUTION ORAL at 09:22

## 2018-11-14 NOTE — PROGRESS NOTE BEHAVIORAL HEALTH - NSBHADMITMEDEDUDETAILS_A_CORE FT
increase Abilify from 10mg po qd to 15mg PO qd for mood labilty/Bipolar symptoms; follow up UE US Sono study

## 2018-11-14 NOTE — PROGRESS NOTE BEHAVIORAL HEALTH - NSBHFUPINTERVALHXFT_PSY_A_CORE
Patient has LE edema that Patient has LE edema that is painful; US lower extremity study ordered. Patient has been taking her medications thus far and denies adverse medication side effects. Patient has shown some clinical response to the Abilify so it will be continued to be used.  Her Synthroid is at 200mcg po qd as of now.

## 2018-11-14 NOTE — PROGRESS NOTE BEHAVIORAL HEALTH - OTHER
looks older than stated age; some fillers/lip enhancement noted relates in an elated, at times flirtatious manner that is not appropriate to context adequate thus far talkative but not pressured " very good", occasional irritability elated, less disorganized, less tangential guarded in general; very paranoid today cannot rule out at this time even though she denies limited to poor less disorganized, less tangential - shown some mild improvement

## 2018-11-15 PROCEDURE — 99232 SBSQ HOSP IP/OBS MODERATE 35: CPT

## 2018-11-15 RX ADMIN — DIVALPROEX SODIUM 250 MILLIGRAM(S): 500 TABLET, DELAYED RELEASE ORAL at 09:57

## 2018-11-15 RX ADMIN — Medication 5 DROP(S): at 09:56

## 2018-11-15 RX ADMIN — Medication 5 DROP(S): at 16:52

## 2018-11-15 RX ADMIN — ARIPIPRAZOLE 15 MILLIGRAM(S): 15 TABLET ORAL at 09:56

## 2018-11-15 RX ADMIN — POLYETHYLENE GLYCOL 3350 17 GRAM(S): 17 POWDER, FOR SOLUTION ORAL at 09:57

## 2018-11-15 RX ADMIN — Medication 5 DROP(S): at 13:00

## 2018-11-15 RX ADMIN — Medication 5 MILLIGRAM(S): at 12:07

## 2018-11-15 RX ADMIN — Medication 5 DROP(S): at 15:48

## 2018-11-15 RX ADMIN — Medication 1 TABLET(S): at 09:58

## 2018-11-15 RX ADMIN — Medication 400 UNIT(S): at 09:56

## 2018-11-15 RX ADMIN — Medication 1 TABLET(S): at 09:56

## 2018-11-15 RX ADMIN — Medication 5 DROP(S): at 20:17

## 2018-11-15 RX ADMIN — Medication 500 MILLIGRAM(S): at 09:56

## 2018-11-15 RX ADMIN — Medication 500 MILLIGRAM(S): at 20:17

## 2018-11-15 RX ADMIN — Medication 200 MICROGRAM(S): at 06:08

## 2018-11-15 NOTE — PROGRESS NOTE BEHAVIORAL HEALTH - NSBHADMITMEDEDUDETAILS_A_CORE FT
Psychoeducation done re: need for Rx adherence for sx management and tx of hypothyroidism with patient sayint "ok'

## 2018-11-15 NOTE — PROGRESS NOTE BEHAVIORAL HEALTH - OTHER
looks older than stated age; some fillers/lip enhancement noted relates in an elated, at times flirtatious manner that is not appropriate to context talkative but not pressured " very good", occasional irritability elated ,but less so than previously less labile minimally disorganized, less tangential - shown some mild improvement guarded in general; verbalizes some paranoia cannot rule out at this time even though she denies limited to poor

## 2018-11-15 NOTE — PROGRESS NOTE BEHAVIORAL HEALTH - NSBHFUPINTERVALHXFT_PSY_A_CORE
Met with and evaluated patient. Discussed case with staff.  No significant interval events are reported.  Patient had US of bilateral lower extremities which were negative for DVT.  Patient reports she had a large bowel movement last night, and she feels much better.  Discussed her living in [facility] for 2 years, and her close relationships with some of the staff.  SW is calling patient's ex- and sisters for more collateral information, as patient is a variable historian at times. Patient continues tangential and somewhat disorganized but sx are improved since admission.  No Rx SE or sx TD/EPS are noted or reported

## 2018-11-16 LAB
CHOLEST SERPL-MCNC: 239 MG/DL — HIGH (ref 10–199)
HBA1C BLD-MCNC: 5.6 % — SIGNIFICANT CHANGE UP (ref 4–5.6)
HDLC SERPL-MCNC: 94 MG/DL — SIGNIFICANT CHANGE UP
LIPID PNL WITH DIRECT LDL SERPL: 125 MG/DL — SIGNIFICANT CHANGE UP
T PALLIDUM AB TITR SER: NEGATIVE — SIGNIFICANT CHANGE UP
TOTAL CHOLESTEROL/HDL RATIO MEASUREMENT: 2.5 RATIO — LOW (ref 3.3–7.1)
TRIGL SERPL-MCNC: 100 MG/DL — SIGNIFICANT CHANGE UP (ref 10–149)
TSH SERPL-MCNC: 197.9 UIU/ML — HIGH (ref 0.27–4.2)

## 2018-11-16 PROCEDURE — 99233 SBSQ HOSP IP/OBS HIGH 50: CPT

## 2018-11-16 RX ADMIN — Medication 500 MILLIGRAM(S): at 09:01

## 2018-11-16 RX ADMIN — Medication 5 DROP(S): at 21:22

## 2018-11-16 RX ADMIN — Medication 1 TABLET(S): at 09:01

## 2018-11-16 RX ADMIN — Medication 200 MICROGRAM(S): at 05:17

## 2018-11-16 RX ADMIN — Medication 400 UNIT(S): at 09:01

## 2018-11-16 RX ADMIN — Medication 5 DROP(S): at 13:00

## 2018-11-16 RX ADMIN — Medication 500 MILLIGRAM(S): at 21:22

## 2018-11-16 RX ADMIN — ARIPIPRAZOLE 15 MILLIGRAM(S): 15 TABLET ORAL at 09:01

## 2018-11-16 RX ADMIN — Medication 5 DROP(S): at 17:00

## 2018-11-16 RX ADMIN — Medication 1 TABLET(S): at 09:02

## 2018-11-16 RX ADMIN — POLYETHYLENE GLYCOL 3350 17 GRAM(S): 17 POWDER, FOR SOLUTION ORAL at 09:00

## 2018-11-16 NOTE — PROGRESS NOTE BEHAVIORAL HEALTH - OTHER
talkative but not pressured " really good", occasional irritability elated ,but less so than previously less labile less disorganized, less tangential - shown some mild improvement guarded in general; verbalizes some paranoia cannot rule out at this time even though she denies impaired, has difficulty integrating and processing information looks older than stated age; some fillers/lip enhancement noted relates in an elated, at times flirtatious manner that is not appropriate to context limited to poor

## 2018-11-16 NOTE — PROGRESS NOTE BEHAVIORAL HEALTH - NSBHFUPINTERVALHXFT_PSY_A_CORE
Met with and evaluated patient. Discussed case with staff.  No significant interval events are reported. Met with patient and mental health atty, Juanito Lui  Ms Lui explained to patient about court hearing which could be on Monday, Nov. 19 or Wednesday, Nov 28  Patient unable to make a decision on which court date she preferred, and was tangential and disorganized, and had difficulty concentrating and processing information.  Patient finally said she will call her friend Ed who she reports is an atty, and will call Ms. Lui today with her decision.  Patient was also verbalizing paranoid ideation about Ed, but reports she wants his advice about her legal issues.  No Rx SE or sx TD/EPS are noted or reported.  Patient denies any current SI or HI.  CAMS assessment done, and patient scores low risk for suicide.  She IDs reasons to live: her family and her son and "I have every reason to live".  She was given the phone numbers for  [facility], and Suicide Hotline, and phone number and address for [facility], she reports she will call them or 911 or go to the ED, or go to the [facility] if she has SI, but adamantly denies ever having SI.

## 2018-11-16 NOTE — PROGRESS NOTE BEHAVIORAL HEALTH - NSBHADMITMEDEDUDETAILS_A_CORE FT
Psychoeducation again done re: need for Rx adherence for sx management and tx of hypothyroidism with patient saying "ok'

## 2018-11-16 NOTE — PROGRESS NOTE BEHAVIORAL HEALTH - NSBHCHARTREVIEWIMAGING_PSY_A_CORE FT
CT of head WNL
US Duplex Venous Lower Extremity, bilateral done 11/14 shows no DVT
US Duplex Venous Lower Extremity, bilateral done 11/14 shows no DVT

## 2018-11-17 RX ORDER — ARIPIPRAZOLE 15 MG/1
20 TABLET ORAL DAILY
Qty: 0 | Refills: 0 | Status: DISCONTINUED | OUTPATIENT
Start: 2018-11-17 | End: 2018-11-26

## 2018-11-17 RX ORDER — ARIPIPRAZOLE 15 MG/1
20 TABLET ORAL DAILY
Qty: 0 | Refills: 0 | Status: DISCONTINUED | OUTPATIENT
Start: 2018-11-17 | End: 2018-11-17

## 2018-11-17 RX ADMIN — Medication 5 DROP(S): at 21:26

## 2018-11-17 RX ADMIN — Medication 1 TABLET(S): at 09:56

## 2018-11-17 RX ADMIN — POLYETHYLENE GLYCOL 3350 17 GRAM(S): 17 POWDER, FOR SOLUTION ORAL at 09:56

## 2018-11-17 RX ADMIN — Medication 200 MICROGRAM(S): at 06:15

## 2018-11-17 RX ADMIN — Medication 5 DROP(S): at 18:23

## 2018-11-17 RX ADMIN — Medication 5 DROP(S): at 09:56

## 2018-11-17 RX ADMIN — ARIPIPRAZOLE 20 MILLIGRAM(S): 15 TABLET ORAL at 09:56

## 2018-11-17 RX ADMIN — Medication 500 MILLIGRAM(S): at 09:55

## 2018-11-17 RX ADMIN — Medication 500 MILLIGRAM(S): at 21:26

## 2018-11-17 RX ADMIN — Medication 1 TABLET(S): at 09:55

## 2018-11-17 RX ADMIN — Medication 400 UNIT(S): at 09:56

## 2018-11-18 PROCEDURE — 99233 SBSQ HOSP IP/OBS HIGH 50: CPT

## 2018-11-18 RX ADMIN — Medication 1 TABLET(S): at 10:04

## 2018-11-18 RX ADMIN — Medication 200 MICROGRAM(S): at 05:41

## 2018-11-18 RX ADMIN — Medication 650 MILLIGRAM(S): at 05:31

## 2018-11-18 RX ADMIN — Medication 650 MILLIGRAM(S): at 03:58

## 2018-11-18 RX ADMIN — Medication 500 MILLIGRAM(S): at 10:04

## 2018-11-18 RX ADMIN — ARIPIPRAZOLE 20 MILLIGRAM(S): 15 TABLET ORAL at 10:04

## 2018-11-18 RX ADMIN — Medication 5 DROP(S): at 16:52

## 2018-11-18 RX ADMIN — Medication 5 DROP(S): at 21:01

## 2018-11-18 RX ADMIN — Medication 400 UNIT(S): at 10:04

## 2018-11-18 RX ADMIN — Medication 500 MILLIGRAM(S): at 21:01

## 2018-11-18 RX ADMIN — Medication 5 DROP(S): at 10:06

## 2018-11-18 NOTE — PROGRESS NOTE BEHAVIORAL HEALTH - OTHER
looks older than stated age; some fillers/lip enhancement noted relates in an elated, at times flirtatious manner that is not appropriate to context talkative but not pressured "I need shampoo!!!!! elated ,but less so than previously less labile disorganized, tangential today; slightly less than on admission but still symptomatic guarded in general; verbalizes some paranoia denies; not suspected at this time maintains with effort limited to poor

## 2018-11-18 NOTE — PROGRESS NOTE BEHAVIORAL HEALTH - NSBHFUPINTERVALHXFT_PSY_A_CORE
patient needed to be redirected by Writer this morning after she was standing for a long period of time at the nurses station, getting more irritable and accusing nurses of "you don't know me" and "you have to let me become myself" and demanding clothing that matched, hydrating shampoo, hair color, etc. patient did not seem to understand that staff has no access to that. She was holding a blank piece of paper saying she will need to write down her needs on it including [place] clothing as she "cannot become myself if I don't match." The interaction involved Patient's tangential and at times illogical though process. Patient said that she does not want to go to Court tomorrow because "I don't want to fight you...I want you to feel I am whole again." Patient was encouraged to go to Court, explained that it is her right, it is not "fighting the staff" and it's an opportunity to be heard and that  [facility] is her advocate.

## 2018-11-19 DIAGNOSIS — F06.30 MOOD DISORDER DUE TO KNOWN PHYSIOLOGICAL CONDITION, UNSPECIFIED: ICD-10-CM

## 2018-11-19 LAB — TSH SERPL-MCNC: 155.5 UIU/ML — HIGH (ref 0.27–4.2)

## 2018-11-19 PROCEDURE — 99233 SBSQ HOSP IP/OBS HIGH 50: CPT

## 2018-11-19 RX ORDER — ZOLPIDEM TARTRATE 10 MG/1
5 TABLET ORAL AT BEDTIME
Qty: 0 | Refills: 0 | Status: DISCONTINUED | OUTPATIENT
Start: 2018-11-19 | End: 2018-11-22

## 2018-11-19 RX ADMIN — Medication 5 DROP(S): at 20:58

## 2018-11-19 RX ADMIN — Medication 650 MILLIGRAM(S): at 02:36

## 2018-11-19 RX ADMIN — Medication 400 UNIT(S): at 09:59

## 2018-11-19 RX ADMIN — POLYETHYLENE GLYCOL 3350 17 GRAM(S): 17 POWDER, FOR SOLUTION ORAL at 21:00

## 2018-11-19 RX ADMIN — ARIPIPRAZOLE 20 MILLIGRAM(S): 15 TABLET ORAL at 09:59

## 2018-11-19 RX ADMIN — Medication 200 MICROGRAM(S): at 06:24

## 2018-11-19 RX ADMIN — Medication 650 MILLIGRAM(S): at 03:10

## 2018-11-19 RX ADMIN — Medication 5 DROP(S): at 09:59

## 2018-11-19 RX ADMIN — Medication 1 TABLET(S): at 09:59

## 2018-11-19 NOTE — PROGRESS NOTE BEHAVIORAL HEALTH - OTHER
talkative but not pressured "I am okay" more euthymic and less labile less labile; more euthymic less tangential but still note linear and goal directed; impaired reasoning remains less guarded then on admission improving looks older than stated age; some fillers/lip enhancement noted improved since the Abilify was added on limited to poor

## 2018-11-19 NOTE — PROGRESS NOTE BEHAVIORAL HEALTH - NSBHFUPINTERVALHXFT_PSY_A_CORE
Interval events: Court hearing was held today; awaiting decision. Patient came to Court and answered questions, she remained calm, cooperative and demonstrated tangentiality as she does on the Unit. Same clinical presentation otherwise; no complaints.

## 2018-11-19 NOTE — PROGRESS NOTE BEHAVIORAL HEALTH - NSBHFUPDXUPDATEFT_PSY_A_CORE
based on history and presentation, also meets criteria for Mood Disorder due to general medical condition

## 2018-11-20 RX ORDER — LEVOTHYROXINE SODIUM 125 MCG
300 TABLET ORAL DAILY
Qty: 0 | Refills: 0 | Status: DISCONTINUED | OUTPATIENT
Start: 2018-11-20 | End: 2018-11-26

## 2018-11-20 RX ORDER — INFLUENZA VIRUS VACCINE 15; 15; 15; 15 UG/.5ML; UG/.5ML; UG/.5ML; UG/.5ML
0.5 SUSPENSION INTRAMUSCULAR ONCE
Qty: 0 | Refills: 0 | Status: COMPLETED | OUTPATIENT
Start: 2018-11-20 | End: 2018-11-21

## 2018-11-20 RX ADMIN — Medication 5 DROP(S): at 18:39

## 2018-11-20 RX ADMIN — POLYETHYLENE GLYCOL 3350 17 GRAM(S): 17 POWDER, FOR SOLUTION ORAL at 09:41

## 2018-11-20 RX ADMIN — Medication 5 DROP(S): at 21:00

## 2018-11-20 RX ADMIN — Medication 1 TABLET(S): at 09:41

## 2018-11-20 RX ADMIN — ARIPIPRAZOLE 20 MILLIGRAM(S): 15 TABLET ORAL at 09:41

## 2018-11-20 RX ADMIN — Medication 650 MILLIGRAM(S): at 22:19

## 2018-11-20 RX ADMIN — Medication 400 UNIT(S): at 09:41

## 2018-11-20 RX ADMIN — Medication 5 DROP(S): at 12:13

## 2018-11-20 RX ADMIN — Medication 5 DROP(S): at 09:41

## 2018-11-20 RX ADMIN — Medication 650 MILLIGRAM(S): at 21:00

## 2018-11-20 RX ADMIN — Medication 200 MICROGRAM(S): at 06:07

## 2018-11-21 PROCEDURE — 99232 SBSQ HOSP IP/OBS MODERATE 35: CPT

## 2018-11-21 RX ADMIN — Medication 5 DROP(S): at 08:46

## 2018-11-21 RX ADMIN — Medication 5 DROP(S): at 13:03

## 2018-11-21 RX ADMIN — POLYETHYLENE GLYCOL 3350 17 GRAM(S): 17 POWDER, FOR SOLUTION ORAL at 08:46

## 2018-11-21 RX ADMIN — Medication 400 UNIT(S): at 08:46

## 2018-11-21 RX ADMIN — Medication 1 TABLET(S): at 08:46

## 2018-11-21 RX ADMIN — Medication 650 MILLIGRAM(S): at 07:12

## 2018-11-21 RX ADMIN — INFLUENZA VIRUS VACCINE 0.5 MILLILITER(S): 15; 15; 15; 15 SUSPENSION INTRAMUSCULAR at 08:46

## 2018-11-21 RX ADMIN — ARIPIPRAZOLE 20 MILLIGRAM(S): 15 TABLET ORAL at 08:46

## 2018-11-21 RX ADMIN — Medication 300 MICROGRAM(S): at 05:47

## 2018-11-21 NOTE — PROGRESS NOTE BEHAVIORAL HEALTH - NSBHADMITMEDEDUDETAILS_A_CORE FT
Psychoeducation provided re: need for Rx and aftercare and medical follow up adherence for sx management and relapse prevention with patient saying "OK, I just want to get out of here soon". TSH down to 155.5

## 2018-11-21 NOTE — CHART NOTE - NSCHARTNOTEFT_GEN_A_CORE
Psych NP Note       Met with patient with RN Sherwin Garber re: patient's request to her sister that the sister give patient debit card numbers.  Sister did not want to do this and patient became angry with her.  Patient reports she wants to buy clothes, and was informed that she is not permitted to buy clothing or anything while she is on the unit, either by phone or on line.  Patient was strongly advised that computers in OT room are for patient's use only if the use is appropriate and approved by staff.  Patient reports she understands this.  Patient continues tangential, disorganized at times, and has difficulty making appropriate decisions.  Leni Diaz NPP

## 2018-11-21 NOTE — PROGRESS NOTE BEHAVIORAL HEALTH - OTHER
looks older than stated age; some fillers/lip enhancement noted improved since the Abilify was added on and increased talkative but not pressured "I am doing fabulous" more euthymic and less labile less labile; more euthymic less tangential but still note linear and goal directed; impaired reasoning remains less guarded then on admission, some paranoia about family members improving limited to poor

## 2018-11-21 NOTE — PROGRESS NOTE BEHAVIORAL HEALTH - NSBHFUPINTERVALHXFT_PSY_A_CORE
Met with and evaluated patient. Case discussed with staff.  No significant interval events reported except that patient was converted to 2PC today after court hearing on 11/19.    Patient continues with some disorganized thoughts, and is tangential at times, and has difficulty focusing at times.  Please see event note of today as to outcome of patient's request for her debit card.  Patient is slowly improving, and is participating in milieu but requires further inpatient stay due to disorganized thoughts, tangential conversations, ongoing medical care for myxedema,  unable to care for self in the community at this time and is undomiciled at this time.  Denies any SI or HI.  No Rx SE or sx TD/EPS are noted or reported

## 2018-11-22 PROCEDURE — 99233 SBSQ HOSP IP/OBS HIGH 50: CPT

## 2018-11-22 RX ORDER — ALPRAZOLAM 0.25 MG
1 TABLET ORAL AT BEDTIME
Qty: 0 | Refills: 0 | Status: DISCONTINUED | OUTPATIENT
Start: 2018-11-22 | End: 2018-11-26

## 2018-11-22 RX ORDER — OXYCODONE AND ACETAMINOPHEN 5; 325 MG/1; MG/1
1 TABLET ORAL
Qty: 0 | Refills: 0 | Status: DISCONTINUED | OUTPATIENT
Start: 2018-11-22 | End: 2018-11-26

## 2018-11-22 RX ORDER — OXYCODONE AND ACETAMINOPHEN 5; 325 MG/1; MG/1
1 TABLET ORAL
Qty: 0 | Refills: 0 | Status: DISCONTINUED | OUTPATIENT
Start: 2018-11-22 | End: 2018-11-22

## 2018-11-22 RX ORDER — ACETAMINOPHEN 500 MG
650 TABLET ORAL EVERY 6 HOURS
Qty: 0 | Refills: 0 | Status: DISCONTINUED | OUTPATIENT
Start: 2018-11-22 | End: 2018-11-26

## 2018-11-22 RX ORDER — MAGNESIUM HYDROXIDE 400 MG/1
30 TABLET, CHEWABLE ORAL DAILY
Qty: 0 | Refills: 0 | Status: DISCONTINUED | OUTPATIENT
Start: 2018-11-22 | End: 2018-11-26

## 2018-11-22 RX ADMIN — Medication 300 MICROGRAM(S): at 06:01

## 2018-11-22 RX ADMIN — MAGNESIUM HYDROXIDE 30 MILLILITER(S): 400 TABLET, CHEWABLE ORAL at 21:19

## 2018-11-22 RX ADMIN — Medication 400 UNIT(S): at 09:29

## 2018-11-22 RX ADMIN — OXYCODONE AND ACETAMINOPHEN 1 TABLET(S): 5; 325 TABLET ORAL at 14:58

## 2018-11-22 RX ADMIN — Medication 1 TABLET(S): at 09:29

## 2018-11-22 RX ADMIN — ARIPIPRAZOLE 20 MILLIGRAM(S): 15 TABLET ORAL at 09:29

## 2018-11-22 RX ADMIN — POLYETHYLENE GLYCOL 3350 17 GRAM(S): 17 POWDER, FOR SOLUTION ORAL at 09:29

## 2018-11-22 NOTE — PROGRESS NOTE BEHAVIORAL HEALTH - OTHER
looks older than stated age; some fillers/lip enhancement noted; reduced facial puffiness fair - improved since the Abilify was added on and increased "better" more euthymic and less labile not labile at this time, full less disorganized and more linear with less impaired reasoning since admission less guarded then on admission, no overt delusions elicited fair - improving improving still limited but improved since admission fair - much improved since the Abilify was added on and increased more euthymic and much less labile

## 2018-11-22 NOTE — PROGRESS NOTE BEHAVIORAL HEALTH - NSBHFUPINTERVALHXFT_PSY_A_CORE
patient showered this morning, washed her hair and asked staff for a Pecocet for sleep - redirected. Overall, less disorganized and more linear with less impaired reasoning since admission and showed response to the Abilify and the active severe hypothyroidism treatment including Synthroid, diuretic and compression stockings, cardiac work up, as well as vitamins and supplements as per Patient's request. Patient also showed reduction in the myxedema and her face is less puffy. Patient's complaint of constipation and request for more then average/allowed stool softeners (and unable to provide consistent history regarding the BMs - reports regular ones to one staff but not to others) leads to suspicion the these agents may have been misused for weight management on the outside. patient showered this morning, washed her hair. calm, cooperative and overall, less disorganized and more linear with less impaired reasoning since admission and showed response to the Abilify and the active severe hypothyroidism treatment including Synthroid, diuretic and compression stockings, cardiac work up, as well as vitamins and supplements as per Patient's request. Patient also showed reduction in the myxedema and her face is less puffy. Patient's complaint of constipation, straining and request for Mag Ox. Constipation common in hypothyroidism thus ordered. Patient also on PRN Dulcolax as per her request; risks/benefits done and cautioned against overuse of such agents; she expressed understanding. Patient does not like Ambien for sleep due to odd experiences and asked for Xanax or Valium as she had used it before as given to her by a PMD for sleep which was useful. Again, risks/benefits explained including benzo side effects and addiction potential; Patient expressed understanding and said she has poor sleep and it makes her feel to have low energy. Patient reports foot surgery, chronic elbow bursitis and left knee pain she sees an orthopedist for - reports flare up of all due to cold temperature/weather changes and asked for PRN Percocet which she used before with good effect; denies misuse/abuse. Again, risks/benefits explained including opiate side effects and addiction potential; Patient expressed understanding . Otherwise said she is thankful to Writer and staff in helping her.

## 2018-11-22 NOTE — PROGRESS NOTE BEHAVIORAL HEALTH - NSBHADMITMEDEDUDETAILS_A_CORE FT
continue Abilify 20mg PO qd - showed clinical response to one agent; no clinical indication to add on another medication. TSH down to 155.5 on 11/19/18 (197.9 on 11/16/18; 224.3 on 11/12/18, and 230.4 on 11/6/18). Next TSH level ordered for 11/26/18 continue Abilify 20mg PO qd - showed clinical response to one agent; no clinical indication to add on another medication. TSH down to 155.5 on 11/19/18 (197.9 on 11/16/18; 224.3 on 11/12/18, and 230.4 on 11/6/18). Next TSH level ordered for 11/26/18. Discontinue Ambien prn for sleep, using PRN Xanax as per Pt's request and preference; no hx of misuse/also family denies substance addiction. Percocet PRN for pain flare up - sudden change in temperature and Pt rooms colder making it likely. PRN Mag Ox ordered as per Pt's preference and request; PRN Miralax discontinued. Hypothyroidism effect BMs, induced constipation which is likely given Pt's severe hypothyroidism. Did not want fiber due to bloating from it before.

## 2018-11-23 PROCEDURE — 99232 SBSQ HOSP IP/OBS MODERATE 35: CPT

## 2018-11-23 PROCEDURE — 74019 RADEX ABDOMEN 2 VIEWS: CPT | Mod: 26

## 2018-11-23 RX ADMIN — Medication 1 TABLET(S): at 08:33

## 2018-11-23 RX ADMIN — MAGNESIUM HYDROXIDE 30 MILLILITER(S): 400 TABLET, CHEWABLE ORAL at 21:05

## 2018-11-23 RX ADMIN — OXYCODONE AND ACETAMINOPHEN 1 TABLET(S): 5; 325 TABLET ORAL at 05:59

## 2018-11-23 RX ADMIN — OXYCODONE AND ACETAMINOPHEN 1 TABLET(S): 5; 325 TABLET ORAL at 03:06

## 2018-11-23 RX ADMIN — ARIPIPRAZOLE 20 MILLIGRAM(S): 15 TABLET ORAL at 08:33

## 2018-11-23 RX ADMIN — Medication 400 UNIT(S): at 08:33

## 2018-11-23 RX ADMIN — Medication 300 MICROGRAM(S): at 06:24

## 2018-11-23 RX ADMIN — Medication 1 TABLET(S): at 08:34

## 2018-11-23 NOTE — PROGRESS NOTE BEHAVIORAL HEALTH - NSBHFUPINTERVALHXFT_PSY_A_CORE
Met with and evaluated patient.  Case discussed with staff.  No significant interval events are noted or reported.  Patient is less disorganized and discussing which hotel would be better for her to live in, and able to express her needs and preferences.  Patient reports she is ok with living in a hotel, she has done it for years and finds it works well for her.  Patient is also less tangential, and able to stay on topic more consistently.  Pleasant and cooperative.  Denies any SI or HI.  Facial swelling is less.  No Rx SE or sx TD/EPS are noted or reported.

## 2018-11-23 NOTE — PROGRESS NOTE BEHAVIORAL HEALTH - OTHER
looks older than stated age; some fillers/lip enhancement noted; reduced facial puffiness fair - much improved since the Abilify was added on and increased "good" more euthymic and much less labile less disorganized and more linear with less impaired reasoning since admission,  much less tangential less guarded then on admission, no overt delusions elicited fair - improving limited to low end of fair still limited but improved since admission

## 2018-11-23 NOTE — PROGRESS NOTE BEHAVIORAL HEALTH - NSBHADMITCOUNSEL_PSY_A_CORE
importance of adherence to chosen treatment/risk factor reduction
importance of adherence to chosen treatment
risk factor reduction/importance of adherence to chosen treatment
importance of adherence to chosen treatment
importance of adherence to chosen treatment/risks and benefits of treatment options
importance of adherence to chosen treatment
importance of adherence to chosen treatment/risks and benefits of treatment options
importance of adherence to chosen treatment

## 2018-11-23 NOTE — PROGRESS NOTE BEHAVIORAL HEALTH - NSBHFUPINTERVALCCFT_PSY_A_CORE
" I am ok today"
" I want to choose a hotel that has a kitchen, so that I can make my meals, and not have to eat in restaurants all the time"
" I want to go home soon"
" I wanted to use my debit card to buy clothes"
" I would like to go back to live at the [facility].  Some of the staff there were very helpful to me"
" If I am discharged, I could go and live in another hotel, or with my sister"
I need to be let go off here immediately.
" I have trouble focusing my thoughts"
What is going on with you today. You want to chat with me?
" The police came to the door of my room.  Someone said I was going to kill myself, but I would never do that.  Why would someone say that?"

## 2018-11-23 NOTE — PROGRESS NOTE BEHAVIORAL HEALTH - NSBHADMITMEDEDUDETAILS_A_CORE FT
Psychoeducation provided re: need for Rx, aftercare and medical outpatient adherence for sx management and relapse prevention with patient verbalizing teach back.  " I will take the medicine, I think it helps me, and I will go for therapy and follow up with a medical doctor--maybe Dr. Hanks in [place]"

## 2018-11-23 NOTE — PROGRESS NOTE BEHAVIORAL HEALTH - NSBHADMITCOORDWITH_PSY_A_CORE
social work/discussed with Dr. Delgadillo
social work/discussed with Dr. Delgadillo
social work/medical staff/discussed with Dr. Delgadillo and Dr. Valdez
discussed with Dr. Delgadillo/social work
social work
other/social work
social work
discussed with Dr. Delgadillo/social work/medical staff

## 2018-11-24 RX ADMIN — OXYCODONE AND ACETAMINOPHEN 1 TABLET(S): 5; 325 TABLET ORAL at 18:17

## 2018-11-24 RX ADMIN — MAGNESIUM HYDROXIDE 30 MILLILITER(S): 400 TABLET, CHEWABLE ORAL at 21:16

## 2018-11-24 RX ADMIN — OXYCODONE AND ACETAMINOPHEN 1 TABLET(S): 5; 325 TABLET ORAL at 21:00

## 2018-11-24 RX ADMIN — Medication 300 MICROGRAM(S): at 05:35

## 2018-11-24 RX ADMIN — Medication 400 UNIT(S): at 08:48

## 2018-11-24 RX ADMIN — OXYCODONE AND ACETAMINOPHEN 1 TABLET(S): 5; 325 TABLET ORAL at 04:00

## 2018-11-24 RX ADMIN — ARIPIPRAZOLE 20 MILLIGRAM(S): 15 TABLET ORAL at 08:48

## 2018-11-24 RX ADMIN — OXYCODONE AND ACETAMINOPHEN 1 TABLET(S): 5; 325 TABLET ORAL at 03:17

## 2018-11-24 RX ADMIN — Medication 1 TABLET(S): at 08:48

## 2018-11-24 RX ADMIN — OXYCODONE AND ACETAMINOPHEN 1 TABLET(S): 5; 325 TABLET ORAL at 18:15

## 2018-11-25 RX ORDER — IBUPROFEN 200 MG
600 TABLET ORAL
Qty: 0 | Refills: 0 | Status: DISCONTINUED | OUTPATIENT
Start: 2018-11-25 | End: 2018-11-26

## 2018-11-25 RX ORDER — FLUTICASONE PROPIONATE 50 MCG
1 SPRAY, SUSPENSION NASAL
Qty: 0 | Refills: 0 | Status: DISCONTINUED | OUTPATIENT
Start: 2018-11-25 | End: 2018-11-26

## 2018-11-25 RX ADMIN — Medication 300 MICROGRAM(S): at 06:23

## 2018-11-25 RX ADMIN — ARIPIPRAZOLE 20 MILLIGRAM(S): 15 TABLET ORAL at 08:48

## 2018-11-25 RX ADMIN — Medication 1 TABLET(S): at 08:48

## 2018-11-25 RX ADMIN — Medication 1 MILLIGRAM(S): at 00:11

## 2018-11-25 RX ADMIN — OXYCODONE AND ACETAMINOPHEN 1 TABLET(S): 5; 325 TABLET ORAL at 21:40

## 2018-11-25 RX ADMIN — OXYCODONE AND ACETAMINOPHEN 1 TABLET(S): 5; 325 TABLET ORAL at 20:40

## 2018-11-25 RX ADMIN — Medication 400 UNIT(S): at 08:48

## 2018-11-25 RX ADMIN — Medication 600 MILLIGRAM(S): at 13:20

## 2018-11-25 RX ADMIN — Medication 5 MILLIGRAM(S): at 20:38

## 2018-11-25 RX ADMIN — Medication 600 MILLIGRAM(S): at 12:20

## 2018-11-26 ENCOUNTER — TRANSCRIPTION ENCOUNTER (OUTPATIENT)
Age: 48
End: 2018-11-26

## 2018-11-26 LAB
ANION GAP SERPL CALC-SCNC: 5 MMOL/L — SIGNIFICANT CHANGE UP (ref 5–17)
BUN SERPL-MCNC: 21 MG/DL — SIGNIFICANT CHANGE UP (ref 7–23)
CALCIUM SERPL-MCNC: 9.2 MG/DL — SIGNIFICANT CHANGE UP (ref 8.4–10.5)
CHLORIDE SERPL-SCNC: 104 MMOL/L — SIGNIFICANT CHANGE UP (ref 96–108)
CO2 SERPL-SCNC: 33 MMOL/L — HIGH (ref 22–31)
CREAT SERPL-MCNC: 0.79 MG/DL — SIGNIFICANT CHANGE UP (ref 0.5–1.3)
FOLATE SERPL-MCNC: 12.6 NG/ML — SIGNIFICANT CHANGE UP
GLUCOSE SERPL-MCNC: 85 MG/DL — SIGNIFICANT CHANGE UP (ref 70–99)
HCT VFR BLD CALC: 34.1 % — LOW (ref 34.5–45)
HGB BLD-MCNC: 11 G/DL — LOW (ref 11.5–15.5)
MCHC RBC-ENTMCNC: 30.9 PG — SIGNIFICANT CHANGE UP (ref 27–34)
MCHC RBC-ENTMCNC: 32.3 GM/DL — SIGNIFICANT CHANGE UP (ref 32–36)
MCV RBC AUTO: 95.8 FL — SIGNIFICANT CHANGE UP (ref 80–100)
NRBC # BLD: 0 /100 WBCS — SIGNIFICANT CHANGE UP (ref 0–0)
PLATELET # BLD AUTO: 285 K/UL — SIGNIFICANT CHANGE UP (ref 150–400)
POTASSIUM SERPL-MCNC: 4.4 MMOL/L — SIGNIFICANT CHANGE UP (ref 3.5–5.3)
POTASSIUM SERPL-SCNC: 4.4 MMOL/L — SIGNIFICANT CHANGE UP (ref 3.5–5.3)
RBC # BLD: 3.56 M/UL — LOW (ref 3.8–5.2)
RBC # FLD: 16.4 % — HIGH (ref 10.3–14.5)
SODIUM SERPL-SCNC: 142 MMOL/L — SIGNIFICANT CHANGE UP (ref 135–145)
TSH SERPL-MCNC: 17.47 UIU/ML — HIGH (ref 0.27–4.2)
VIT B12 SERPL-MCNC: 1011 PG/ML — SIGNIFICANT CHANGE UP (ref 232–1245)
WBC # BLD: 5.01 K/UL — SIGNIFICANT CHANGE UP (ref 3.8–10.5)
WBC # FLD AUTO: 5.01 K/UL — SIGNIFICANT CHANGE UP (ref 3.8–10.5)

## 2018-11-26 PROCEDURE — 94640 AIRWAY INHALATION TREATMENT: CPT

## 2018-11-26 PROCEDURE — 84443 ASSAY THYROID STIM HORMONE: CPT

## 2018-11-26 PROCEDURE — 99285 EMERGENCY DEPT VISIT HI MDM: CPT

## 2018-11-26 PROCEDURE — 83735 ASSAY OF MAGNESIUM: CPT

## 2018-11-26 PROCEDURE — 82746 ASSAY OF FOLIC ACID SERUM: CPT

## 2018-11-26 PROCEDURE — 90686 IIV4 VACC NO PRSV 0.5 ML IM: CPT

## 2018-11-26 PROCEDURE — 81001 URINALYSIS AUTO W/SCOPE: CPT

## 2018-11-26 PROCEDURE — 80307 DRUG TEST PRSMV CHEM ANLYZR: CPT

## 2018-11-26 PROCEDURE — 93306 TTE W/DOPPLER COMPLETE: CPT

## 2018-11-26 PROCEDURE — 80061 LIPID PANEL: CPT

## 2018-11-26 PROCEDURE — 93005 ELECTROCARDIOGRAM TRACING: CPT

## 2018-11-26 PROCEDURE — 80048 BASIC METABOLIC PNL TOTAL CA: CPT

## 2018-11-26 PROCEDURE — 85027 COMPLETE CBC AUTOMATED: CPT

## 2018-11-26 PROCEDURE — 80053 COMPREHEN METABOLIC PANEL: CPT

## 2018-11-26 PROCEDURE — 83550 IRON BINDING TEST: CPT

## 2018-11-26 PROCEDURE — 83540 ASSAY OF IRON: CPT

## 2018-11-26 PROCEDURE — 82607 VITAMIN B-12: CPT

## 2018-11-26 PROCEDURE — 74018 RADEX ABDOMEN 1 VIEW: CPT

## 2018-11-26 PROCEDURE — 70450 CT HEAD/BRAIN W/O DYE: CPT

## 2018-11-26 PROCEDURE — 81025 URINE PREGNANCY TEST: CPT

## 2018-11-26 PROCEDURE — 93970 EXTREMITY STUDY: CPT

## 2018-11-26 PROCEDURE — 99239 HOSP IP/OBS DSCHRG MGMT >30: CPT

## 2018-11-26 PROCEDURE — 36415 COLL VENOUS BLD VENIPUNCTURE: CPT

## 2018-11-26 PROCEDURE — 83036 HEMOGLOBIN GLYCOSYLATED A1C: CPT

## 2018-11-26 PROCEDURE — 86780 TREPONEMA PALLIDUM: CPT

## 2018-11-26 PROCEDURE — 74019 RADEX ABDOMEN 2 VIEWS: CPT

## 2018-11-26 RX ORDER — CHOLECALCIFEROL (VITAMIN D3) 125 MCG
400 CAPSULE ORAL
Qty: 30 | Refills: 0 | OUTPATIENT
Start: 2018-11-26 | End: 2018-12-25

## 2018-11-26 RX ORDER — LEVOTHYROXINE SODIUM 125 MCG
1 TABLET ORAL
Qty: 30 | Refills: 0 | OUTPATIENT
Start: 2018-11-26 | End: 2018-12-25

## 2018-11-26 RX ORDER — ARIPIPRAZOLE 15 MG/1
1 TABLET ORAL
Qty: 30 | Refills: 0 | OUTPATIENT
Start: 2018-11-26 | End: 2018-12-25

## 2018-11-26 RX ORDER — ALPRAZOLAM 0.25 MG
1 TABLET ORAL
Qty: 15 | Refills: 0 | OUTPATIENT
Start: 2018-11-26 | End: 2018-12-10

## 2018-11-26 RX ADMIN — OXYCODONE AND ACETAMINOPHEN 1 TABLET(S): 5; 325 TABLET ORAL at 02:20

## 2018-11-26 RX ADMIN — Medication 1 MILLIGRAM(S): at 01:20

## 2018-11-26 RX ADMIN — ARIPIPRAZOLE 20 MILLIGRAM(S): 15 TABLET ORAL at 08:52

## 2018-11-26 RX ADMIN — Medication 600 MILLIGRAM(S): at 12:50

## 2018-11-26 RX ADMIN — Medication 600 MILLIGRAM(S): at 11:50

## 2018-11-26 RX ADMIN — OXYCODONE AND ACETAMINOPHEN 1 TABLET(S): 5; 325 TABLET ORAL at 01:20

## 2018-11-26 RX ADMIN — Medication 1 SPRAY(S): at 13:45

## 2018-11-26 RX ADMIN — Medication 1 TABLET(S): at 08:52

## 2018-11-26 RX ADMIN — Medication 300 MICROGRAM(S): at 06:02

## 2018-11-26 RX ADMIN — Medication 400 UNIT(S): at 08:52

## 2018-11-26 NOTE — PROGRESS NOTE BEHAVIORAL HEALTH - PRIMARY DX
Bipolar affective disorder
Psychosis, unspecified psychosis type
Bipolar affective disorder
Psychosis, unspecified psychosis type
Bipolar affective disorder
Psychosis, unspecified psychosis type

## 2018-11-26 NOTE — PROGRESS NOTE BEHAVIORAL HEALTH - ATTENTION / CONCENTRATION
Other
Impaired
Impaired
Other
Other
Impaired
Impaired
Other
Impaired
Other
Impaired
Other
Other
Impaired
Impaired

## 2018-11-26 NOTE — PROGRESS NOTE BEHAVIORAL HEALTH - NSBHADMITDANGERSELF_PSY_A_CORE
unable to care for self
unable to care for self/denies any SI on 11/7

## 2018-11-26 NOTE — PROGRESS NOTE BEHAVIORAL HEALTH - THOUGHT PROCESS
Other/Disorganized/Tangential/Impaired reasoning
Tangential
Circumstantial/Disorganized/Flight of ideas/Impaired reasoning
Other
Disorganized/Other/Impaired reasoning/Tangential
Tangential
Disorganized/Other/Tangential/Impaired reasoning
Tangential/Other/Impaired reasoning/Disorganized
Disorganized/Tangential/Flight of ideas/Impaired reasoning
Tangential/Other
Tangential/Disorganized/Impaired reasoning/Other
Tangential/Impaired reasoning/Other/Disorganized
Tangential/Other/Disorganized/Impaired reasoning
Tangential/Other/Impaired reasoning/Disorganized
Other/Linear
Flight of ideas/Impaired reasoning/Circumstantial/Disorganized
Tangential

## 2018-11-26 NOTE — PROGRESS NOTE BEHAVIORAL HEALTH - NSBHADMITMEDEDUDETAILS_A_CORE FT
continue Abilify 20mg PO qd - showed clinical response to one agent; no clinical indication to add on another medication. TSH down to 155.5 on 11/19/18 (197.9 on 11/16/18; 224.3 on 11/12/18, and 230.4 on 11/6/18). Next TSH level ordered for 11/26/18 (drawn and pending). Discontinue Ambien prn for sleep, using PRN Xanax as per Pt's request and preference; no hx of misuse/also family denies substance addiction. Percocet PRN for pain flare up - sudden change in temperature and Pt rooms colder making it likely. PRN Mag Ox ordered as per Pt's preference and request; PRN Miralax discontinued. Hypothyroidism effect BMs, induced constipation which is likely given Pt's severe hypothyroidism. Did not want fiber due to bloating from it before.

## 2018-11-26 NOTE — PROGRESS NOTE BEHAVIORAL HEALTH - BODY HABITUS
Well nourished/Average build
Well nourished
Well nourished/Average build
Well nourished
Well nourished
Average build/Well nourished
Average build/Well nourished
Well nourished
Average build/Well nourished
Well nourished
Well nourished/Average build
Average build/Well nourished
Well nourished
Well nourished/Average build
Well nourished

## 2018-11-26 NOTE — PROGRESS NOTE BEHAVIORAL HEALTH - NSBHFUPMEDSE_PSY_A_CORE
None known

## 2018-11-26 NOTE — DISCHARGE NOTE ADULT - HOSPITAL COURSE
On medical evaluation, Patient was diagnosed with severe hypothyroidism causing myxedema resulting in need or diuretic, compression stocking and cardiac work up (TTE) and lower extremity doppler due to pain. Patient on the Unit exhibit manic-like symptoms. Based on what patient was saying about her past, suspecting that she was on the Bipolar spectrum / Bipolar II most of her life and her worsening medical issue (ie. severe hypothyroidism) and getting older living alone, stressors pushed her into a manic state. UTOX and serum tox were negative in the ED and Pt's and family's denial of drug misuse make substance induced symptoms less likely in this case. Court hearing scheduled for 11/19/18 given 9.39 expiration and patient's desire for discharge and refusal to be converted to voluntary. Court granted Hospital's motion and Patient's retention was extended. She was converted to 2PC/9.27 on 11/21/18; her Mental Hygiene representative was aware and Patient was given notice. Patient has been showing clinical improvement progressively and estimated to be ready for a less restrictive setting in the upcoming week.

## 2018-11-26 NOTE — PROGRESS NOTE BEHAVIORAL HEALTH - AXIS III
hypothyroidism, L ankle pain
hypothyroidism, L ankle pain
severe hypothyroidism, L ankle pain
hypothyroidism, L ankle pain
severe hypothyroidism, L ankle pain
hypothyroidism, L ankle pain
severe hypothyroidism, L ankle pain
hypothyroidism, L ankle pain
severe hypothyroidism, L ankle pain
hypothyroidism, L ankle pain
severe hypothyroidism, L ankle pain
severe hypothyroidism, L ankle pain
hypothyroidism, L ankle pain

## 2018-11-26 NOTE — PROGRESS NOTE BEHAVIORAL HEALTH - GAIT / STATION
Normal gait / station

## 2018-11-26 NOTE — PROGRESS NOTE BEHAVIORAL HEALTH - NS ED BHA MED ROS GASTROINTESTINAL
No complaints
Yes
No complaints
Yes
No complaints

## 2018-11-26 NOTE — PROGRESS NOTE BEHAVIORAL HEALTH - NSBHFUPTYPE_PSY_A_CORE
Inpatient
Inpatient-On Service Note
Inpatient
Inpatient

## 2018-11-26 NOTE — DISCHARGE NOTE ADULT - MEDICATION SUMMARY - MEDICATIONS TO TAKE
I will START or STAY ON the medications listed below when I get home from the hospital:    oxyCODONE-acetaminophen 5 mg-325 mg oral tablet  -- 1 tab(s) by mouth 2 times a day, As needed, Severe Pain (7 - 10); dispense 15 day supply MDD:10mg  -- Indication: For chronic pain     Ativan 1 mg oral tablet  -- 1 tab(s) by mouth once a day, As Needed -Anxiety MDD:2mg; dispense 15 day supply   -- Indication: For anxiety    ARIPiprazole 20 mg oral tablet  -- 1 tab(s) by mouth once a day for thought organization; dispense 30 day supply   -- Indication: For Mood disorder due to medical condition    ALPRAZolam 1 mg oral tablet  -- 1 tab(s) by mouth once a day (at bedtime), As needed, insomnia MDD:2mg; dispense 15 day supply   -- Indication: For insomnia    bisacodyl 5 mg oral delayed release tablet  -- 1 tab(s) by mouth every 12 hours, As needed, Constipation; dispense 30 day supply MDD:10mg  -- Indication: For constipation     levothyroxine 300 mcg (0.3 mg) oral tablet  -- 1 tab(s) by mouth once a day for hypothyroidism; dispense 30 day supply   -- Indication: For hypothyroidism     cholecalciferol oral tablet  -- 400 unit(s) by mouth once a day for supplement; dispense 30 day supply   -- Indication: For supplement

## 2018-11-26 NOTE — PROGRESS NOTE BEHAVIORAL HEALTH - NSBHFUPINTERVALHXFT_PSY_A_CORE
Patient and Treatment Team (MD, NP, SWs, RT) met with Patient this morning to finalize details about her discharge. Patient ultimatelty decided on temporarily staying with her romantic partner Ed who invited her to stay with him with plan to move her things into a new hotel ([facility] in [place]) with plans to looks for rentals. Patient is planning to see Dr Hanks for primary care and will call her Endocrinologist Dr Perlman to return for follow up. She has some belongings in storage and her sister also picked up her other things from [place] before. Patient reports that she is planning on following up with both a psychiatrist and an Internist/endocrinologist and is able to manifest capacity to make her medical decisions including engaging in discharge planning. Patient is still focused on hair color and getting it styled as her priority but it's highly likely her Axis II traits. Patient has demonstrated clinical improvement since admission and is able to be maintained on a less restrictive setting at this time. She will be discharged once after care appointment is obtained. Patient endorses an improved euthymic mood, improved sleep / appetite / energy level / concentration. Denies any symptoms of uma/psychosis/major depression/ARACELY/panic. Denies any active or passive suicidal or homicidal ideation. Names protective factors (layla; family; hope for future). Endorses medication compliance. Denies adverse medication side effects. denies access to guns/weapons on the outside. + has social supports (sister, Ed)

## 2018-11-26 NOTE — CHART NOTE - NSCHARTNOTEFT_GEN_A_CORE
Psych NP Note--       Met with patient to discuss need for medical and psychiatric aftercare.  She reports she will keep therapy/Rx appts set up by JAKE, and will set up an appt. with Dr. Hanks, internist, ASAP, and will follow up with her medical care.  Patient verbalizes understanding of her need for thyroid Rx, and psychiatric Rx and reports will comply with Rx and aftercare.  Denies any SI or HI, and reports if she has SI she will return to the ED, call the  [facility] and/ or the suicide hotline, or call or go to [facility] in [place].  Patient has phone numbers for:  [facility], Suicide Hotline, and phone number and address for [facility] in [place]. Patient IDs reasons to live_"I love life", her family, her friends, and things she enjoys, such as shopping.  Patient reports she feels she got "great care" here, and thanked the staff for all their help.  Leni Diaz NPP

## 2018-11-26 NOTE — DISCHARGE NOTE ADULT - PLAN OF CARE
stabilize underlying medical condition Synthroid tapered up to 300mcg po qd for severe hypothyroidism, TSH > 240 reduced to 17.47 as of 11/26/18. Myxedema with Lasix reduce mood and affective lability; improve sleep, increase ability to maintain attention did well on Abilify 20mg PO qd with symptoms reduction and Patient requested Xanax 1mg po qhs prn for sleep and did not want any other agent (tried Ambien at home before with odd experience)

## 2018-11-26 NOTE — DISCHARGE NOTE ADULT - OTHER SIGNIFICANT FINDINGS
Synthroid tapered up to 300mcg po qd for severe hypothyroidism, TSH > 240 reduced to 17.47 as of 11/26/18.

## 2018-11-26 NOTE — PROGRESS NOTE BEHAVIORAL HEALTH - THOUGHT CONTENT
Other
Other/Preoccupations/Ruminations
Other
Preoccupations/Ruminations/Other
Delusions/Other
Other
Other/Delusions
Ruminations/Preoccupations/Other
Other
Other
Preoccupations/Ruminations/Delusions/Other
Other
Delusions/Other
Delusions/Other
Unremarkable
Other
Other

## 2018-11-26 NOTE — DISCHARGE NOTE ADULT - ADDITIONAL INSTRUCTIONS
please see your Internist Dr Latonya DENNEY and call Dr Perlman's office to ask for a return to the practice (endocrinology)

## 2018-11-26 NOTE — PROGRESS NOTE BEHAVIORAL HEALTH - NSBHPTASSESSDT_PSY_A_CORE
08-Nov-2018
10-Nov-2018 18:12
12-Nov-2018
13-Nov-2018 14:09
14-Nov-2018
15-Nov-2018 13:17
16-Nov-2018 11:33
18-Nov-2018
21-Nov-2018
23-Nov-2018 14:00
26-Nov-2018
19-Nov-2018
07-Nov-2018 11:02
08-Nov-2018
11-Nov-2018 11:48
22-Nov-2018
09-Nov-2018

## 2018-11-26 NOTE — PROGRESS NOTE BEHAVIORAL HEALTH - NS ED BHA MSE SPEECH RATE
Other
Normal
Other
Normal
Normal
Other
Normal
Other
Other
Normal
Other
Normal
Other
Other

## 2018-11-26 NOTE — PROGRESS NOTE BEHAVIORAL HEALTH - BEHAVIOR
Cooperative
Self
Cooperative

## 2018-11-26 NOTE — PROGRESS NOTE BEHAVIORAL HEALTH - PRN MEDS
Percocet and Magnesium susp
Tylenol
Dulcolax for constipation
Dulcolax
Dulcolax for constipation, Tylenol for pain
Percocet
Dulcolax
Percocet

## 2018-11-26 NOTE — PROGRESS NOTE BEHAVIORAL HEALTH - SECONDARY DX1
Mood disorder due to medical condition

## 2018-11-26 NOTE — PROGRESS NOTE BEHAVIORAL HEALTH - NSBHADMITIPOBS_PSY_A_CORE
Routine observation

## 2018-11-26 NOTE — DISCHARGE NOTE ADULT - REASON FOR ADMISSION
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 In the ED, Patient was guarded, tangential, and minimally cooperative, unable to engage in meaningful conversation including give a succinct history on herself, had a TSH of > 240. She was admitted on a 9.39

## 2018-11-26 NOTE — PROGRESS NOTE BEHAVIORAL HEALTH - ABNORMAL MOVEMENTS
No abnormal movements

## 2018-11-26 NOTE — PROGRESS NOTE BEHAVIORAL HEALTH - NSBHADMITIPREASON_PSY_A_CORE
Danger to self; mental illness expected to respond to inpatient care

## 2018-11-26 NOTE — PROGRESS NOTE BEHAVIORAL HEALTH - MOOD
Other/Irritable
Anxious
Other
Other
Irritable/Other
Anxious
Irritable/Other
Irritable/Other
Other
Other
Other/Anxious
Other
Other/Irritable
Irritable/Other
Other
Anxious
Other

## 2018-11-26 NOTE — PROGRESS NOTE BEHAVIORAL HEALTH - NSBHCHARTREVIEWLAB_PSY_A_CORE FT
TSH=230.40    abnormal UA
TSH of 11/1975=556.50    For TSH on 11/26   Also for serum folate, serum B12, CBC and BMP on 11/26
TSH of 11/1983=307.50
TSH=224.30 ON 11/12
TSH=224.30 ON 11/12
for TSH on 11/16         TSH on jjzbg=427.40
TSH pending for today
for TSH on 11/16, awaitng results         TSH on aijvl=426.40
Mg=2.3

## 2018-11-26 NOTE — DISCHARGE NOTE ADULT - LOCATION
Patient reports foot surgery, chronic elbow bursitis and left knee pain she sees an orthopedist for - reports flare up of all due to cold temperature/weather changes and asked for PRN Percocet which she used before with good effect; denies misuse/abuse.

## 2018-11-26 NOTE — PROGRESS NOTE BEHAVIORAL HEALTH - NSBHLEGALSTATUS_PSY_A_CORE
converted 11/21/18; Court hearing 11/19/18 granting retention to Hospital/9.27 (2PC)
9.39 (Emergency)
9.39 (Emergency)
converted 11/21/18; Court hearing 11/19/18 granting retention to Hospital/9.27 (2PC)
converted 11/21/18; Court hearing 11/19/18 granting retention to Hospital/9.27 (2PC)
9.39 (Emergency)
2PC as of 11/21/18/9.27 (2PC)
9.39 (Emergency)

## 2018-11-26 NOTE — DISCHARGE NOTE ADULT - PATIENT PORTAL LINK FT
You can access the [facility], offered by [facility], by registering with the following website: [email or web address]

## 2018-11-26 NOTE — PROGRESS NOTE BEHAVIORAL HEALTH - RISK ASSESSMENT
currently at high risk to self given disorganized behavior and thinking impairing reality test with poor judgment, poor insight, impulsive behavior, poor self care resulting in very high TSH levels due to noncompliance, limited social supports with no current access to care/treatment.
Denies any SI or HI.  Risk factors: prior non compliance with medical regime, mood episode, irritability, poor judgement, acute medical issue (severe hypothyroidism),   Protective factors:  IDs reasons to live, reports motivation for medical and psychiatric follow up after discharge, supportive family, fear of death, improved mood and thought process
currently at high risk to self given disorganized behavior and thinking impairing reality test with poor judgment, poor insight, impulsive behavior, poor self care resulting in very high TSH levels due to noncompliance, limited social supports with no current access to care/treatment.
Risk factors include recent suicidal statements, acute psychosocial stressors, limited social supports, not in psych treatment, and unable to engage in safety planning. At this time pt is at high imminent risk of harm and requires inpt hospitalization for safety and stabilization.
currently at high risk to self given disorganized behavior and thinking impairing reality test with poor judgment, poor insight, impulsive behavior, poor self care resulting in very high TSH levels due to noncompliance, limited social supports with no current access to care/treatment.
currently at high risk to self given disorganized behavior and thinking impairing reality test with poor judgment, poor insight, impulsive behavior, poor self care resulting in very high TSH levels due to noncompliance, limited social supports with no current access to care/treatment.
currently at high risk to self given disorganized  thinking impairing reality test with poor judgment, poor insight, impulsive behavior, poor self care resulting in very high TSH levels due to noncompliance, limited social supports with no current access to care/treatment.
currently at high risk to self given disorganized behavior and thinking impairing reality test with poor judgment, poor insight, impulsive behavior, poor self care resulting in very high TSH levels due to noncompliance, limited social supports with no current access to care/treatment.
currently at high risk to self given disorganized behavior and thinking impairing reality test with poor judgment, poor insight, impulsive behavior, poor self care resulting in very high TSH levels due to noncompliance, limited social supports with no current access to care/treatment.
currently at high risk to self given disorganized  thinking impairing reality test with poor judgment, poor insight, impulsive behavior, poor self care resulting in very high TSH levels due to noncompliance, limited social supports with no current access to care/treatment.
currently at high risk to self given disorganized behavior and thinking impairing reality test with poor judgment, poor insight, impulsive behavior, poor self care resulting in very high TSH levels due to noncompliance, limited social supports with no current access to care/treatment.
currently at high risk to self given disorganized behavior and thinking impairing reality test with poor judgment, poor insight, impulsive behavior, poor self care resulting in very high TSH levels due to noncompliance, limited social supports with no current access to care/treatment.
Risk factors include recent suicidal statements, acute psychosocial stressors, limited social supports, not in psych treatment, and unable to engage in safety planning. At this time pt is at high imminent risk of harm and requires inpt hospitalization for safety and stabilization.
Risk factors include recent suicidal statements, acute psychosocial stressors, limited social supports, not in psych treatment, and unable to engage in safety planning. At this time pt is at high imminent risk of harm and requires inpt hospitalization for safety and stabilization.

## 2018-11-26 NOTE — PROGRESS NOTE BEHAVIORAL HEALTH - NSBHADMITIPBHPROVIDER_PSY_A_CORE
does not have one/N/A
N/A/does not have one
does not have one/N/A
N/A/does not have one
does not have one/N/A
N/A/does not have one
N/A

## 2018-11-26 NOTE — PROGRESS NOTE BEHAVIORAL HEALTH - AFFECT CONGRUENCE
Congruent
Congruent
Not congruent
Congruent
Not congruent
Not congruent
Congruent
Congruent
Not congruent
Congruent
Not congruent
Congruent
Congruent
Not congruent

## 2018-11-26 NOTE — DISCHARGE NOTE ADULT - CARE PLAN
Principal Discharge DX:	Mood disorder due to medical condition  Goal:	stabilize underlying medical condition  Assessment and plan of treatment:	Synthroid tapered up to 300mcg po qd for severe hypothyroidism, TSH > 240 reduced to 17.47 as of 11/26/18. Myxedema with Lasix  Secondary Diagnosis:	Bipolar affective disorder  Goal:	reduce mood and affective lability; improve sleep, increase ability to maintain attention  Assessment and plan of treatment:	did well on Abilify 20mg PO qd with symptoms reduction and Patient requested Xanax 1mg po qhs prn for sleep and did not want any other agent (tried Ambien at home before with odd experience)

## 2018-11-26 NOTE — PROGRESS NOTE BEHAVIORAL HEALTH - OTHER
looks older than stated age; some fillers/lip enhancement noted; reduced facial puffiness fair - much improved since admission; calm, cooperative and funny "good" tendency to have superfluous information which may be baseline; can be still circumstantial - but much improved since admission fair - much improved improved - fair improved - low end of fair

## 2018-11-26 NOTE — PROGRESS NOTE BEHAVIORAL HEALTH - SUMMARY
49 yo female with unsubstantiated past psychiatric history including substance use presenting in a state consistent with a Bipolar manic episode. She was admitted on a 9.39 involuntary admission.
49 yo female with unsubstantiated past psychiatric history including substance use presenting in a state consistent with a Bipolar manic episode. She was admitted on a 9.39 involuntary admission.
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 In the ED, Patient was guarded, tangential, and minimally cooperative, unable to engage in meaningful conversation including give a succinct history on herself, had a TSH of > 240. She was admitted on a 9.39 On medical evaluation, Patient was diagnosed with severe hypothyroidism causing myxedema resulting in need or diuretic, compression stocking and cardiac work up (TTE) and lower extremity doppler due to pain. Patient on the Unit exhibit manic-like symptoms. Based on what patient was saying about her past, suspecting that she was on the Bipolar spectrum / Bipolar II most of her life and her worsening medical issue (ie. severe hypothyroidism) and getting older living alone, stressors pushed her into a manic state. UTOX and serum tox were negative in the ED and Pt's and family's denial of drug misuse make substance induced symptoms less likely in this case. Court hearing scheduled for 11/19/18 given 9.39 expiration and patient's desire for discharge and refusal to be converted to voluntary. Court granted Hospital's motion and Patient's retention was extended. She was converted to 2PC/9.27 on 11/21/18; her Mental Hygiene representative was aware and Patient was given notice. Patient has been showing clinical improvement progressively and estimated to be ready for a less restrictive setting in the upcoming week.
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 In the ED, Patient was guarded, tangential, and minimally cooperative, unable to engage in meaningful conversation including give a succinct history on herself, had a TSH of > 240. She was admitted on a 9.39 On medical evaluation, Patient was diagnosed with severe hypothyroidism causing myxedema resulting in need or diuretic, compression stocking and cardiac work up (TTE) and lower extremity doppler due to pain. Patient on the Unit exhibit manic-like symptoms. Based on what patient was saying about her past, suspecting that she was on the Bipolar spectrum / Bipolar II most of her life and her worsening medical issue (ie. severe hypothyroidism) and getting older living alone, stressors pushed her into a manic state. UTOX and serum tox were negative in the ED and Pt's and family's denial of drug misuse make substance induced symptoms less likely in this case. Court hearing scheduled for 11/19/18 given 9.39 expiration and patient's desire for discharge and refusal to be converted to voluntary. Court granted Hospital's motion and Patient's retention was extended. She was converted to 2PC/9.27 on 11/21/18; her Mental Hygiene representative was aware and Patient was given notice. Patient has been showing clinical improvement progressively and estimated to be ready for a less restrictive setting in the upcoming week.
Patient is a , single, unemployed, 47 yo  female, who has been living at 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 In the ED, Patient was guarded, tangential, and minimally cooperative, unable to engage in meaningful conversation including give a succinct history on herself, had a TSH of > 240. She was admitted on a 9.39 On medical evaluation, Patient was diagnosed with severe hypothyroidism causing myxedema resulting in need or diuretic, compression stocking and cardiac work up (TTE) and lower extremity doppler due to pain. Patient on the Unit exhibit manic-like symptoms. Based on what patient was saying about her past, suspecting that she was on the Bipolar spectrum / Bipolar II most of her life and her worsening medical issue (ie. severe hypothyroidism) and getting older living alone, stressors pushed her into a manic state. UTOX and serum tox were negative in the ED and Pt's and family's denial of drug misuse make substance induced symptoms less likely in this case. Court hearing scheduled for 11/19/18 given 9.39 expiration and patient's desire for discharge and refusal to be converted to voluntary.
47 yo female with unsubstantiated past psychiatric history including substance use presenting in a state consistent with a Bipolar manic episode. She was admitted on a 9.39 involuntary admission.
Patient is a 49 y/o  F, in the process of divorce, has a 18 y/o son who lives with her ex-, unemployed, domiciled at [facility] x 2 years, with as of yet unsubstantiated past psychiatric history, as of yet unable to get a hold of collateral (message left for ), who was BIB EMS from her domicile ([facility]) after Patient made suicidal statements to staff and has been behaving more bizarrely in the last two weeks (described as "eccentric" as baseline; now appearing thinner, has many cuts/bruises, wearing diapers, making suicidal statements that she will kill herself when she runs out of money, and telling [facility] staff that "she will miss them"). In the ED, Patient was verbally agitated, guarded, tangential, uncooperative / refusing recommended procedures, making bizarre statements and demanding to leave. As per [facility] staff, Pt's estranged  and friend have been frequently calling the [facility] to ask if Patient was still alive. Patient was deemed an imminent risk of harm to self and admitted on a 9.39.
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 In the ED, Patient was guarded, tangential, and minimally cooperative, unable to engage in meaningful conversation including give a succinct history on herself, had a TSH of > 240. She was admitted on a 9.39 On medical evaluation, Patient was diagnosed with severe hypothyroidism causing myxedema resulting in need or diuretic, compression stocking and cardiac work up (TTE) and lower extremity doppler due to pain. Patient on the Unit exhibit manic-like symptoms. Based on what patient was saying about her past, suspecting that she was on the Bipolar spectrum / Bipolar II most of her life and her worsening medical issue (ie. severe hypothyroidism) and getting older living alone, stressors pushed her into a manic state. UTOX and serum tox were negative in the ED and Pt's and family's denial of drug misuse make substance induced symptoms less likely in this case. Court hearing held today on  11/19/18 given 9.39 expiration and patient's desire for discharge and refusal to be converted to voluntary.
47 yo female with unsubstantiated past psychiatric history including substance use presenting in a state consistent with a Bipolar manic episode. She was admitted on a 9.39 involuntary admission.
47 yo female with unsubstantiated past psychiatric history including substance use presenting in a state consistent with a Bipolar manic episode. She was admitted on a 9.39 involuntary admission.  Adjusting to unit, and sx are slowly resolving.  TSH on admit was 230.40
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 In the ED, Patient was guarded, tangential, and minimally cooperative, unable to engage in meaningful conversation including give a succinct history on herself, had a TSH of > 240. She was admitted on a 9.39 On medical evaluation, Patient was diagnosed with severe hypothyroidism causing myxedema resulting in need or diuretic, compression stocking and cardiac work up (TTE) and lower extremity doppler due to pain. Patient on the Unit exhibit manic-like symptoms. Based on what patient was saying about her past, suspecting that she was on the Bipolar spectrum / Bipolar II most of her life and her worsening medical issue (ie. severe hypothyroidism) and getting older living alone, stressors pushed her into a manic state. UTOX and serum tox were negative in the ED and Pt's and family's denial of drug misuse make substance induced symptoms less likely in this case. Court hearing held today on  11/19/18 given 9.39 expiration and patient's desire for discharge and refusal to be converted to voluntary.  Patient was 2PC on 11/21
49 yo female with unsubstantiated past psychiatric history including substance use presenting in a state consistent with a Bipolar manic episode. She was admitted on a 9.39 involuntary admission.  Adjusting to unit, and is social with select peers.  Some sx improvement on Abilify,  TSH continues to be very elevated
49 yo female with unsubstantiated past psychiatric history including substance use presenting in a state consistent with a Bipolar manic episode. She was admitted on a 9.39 involuntary admission.  Adjusting to unit, and sx are slowly resolving.  TSH on admit was 230.40
47 yo female with unsubstantiated past psychiatric history including substance use presenting in a state consistent with a Bipolar manic episode. She was admitted on a 9.39 involuntary admission.  Adjusting to unit, and is social with select peers.  Some sx improvement on Abilify,  TSH continues to be very elevated
Patient is a , single, unemployed, 49 yo  female, who has been living at 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 In the ED, Patient was guarded, tangential, and minimally cooperative, unable to engage in meaningful conversation including give a succinct history on herself, had a TSH of > 240. She was admitted on a 9.39 On medical evaluation, Patient was diagnosed with severe hypothyroidism causing myxedema resulting in need or diuretic, compression stocking and cardiac work up (TTE) and lower extremity doppler due to pain. Patient on the Unit exhibit manic-like symptoms. Based on what patient was saying about her past, suspecting that she was on the Bipolar spectrum / Bipolar II most of her life and her worsening medical issue (ie. severe hypothyroidism) and getting older living alone, stressors pushed her into a manic state. UTOX and serum tox were negative in the ED and Pt's and family's denial of drug misuse make substance induced symptoms less likely in this case. Court hearing scheduled for 11/19/18 given 9.39 expiration and patient's desire for discharge and refusal to be converted to voluntary. Court granted Hospital's motion and Patient's retention was extended. She was converted to 2PC/9.27 on 11/21/18; her Mental Hygiene representative was aware and Patient was given notice. Patient has been showing clinical improvement progressively and estimated to be ready for a less restrictive setting in the upcoming week.
Patient is a 49 y/o  F, in the process of divorce, has a 20 y/o son who lives with her ex-, unemployed, domiciled at [facility] x 2 years, with as of yet unsubstantiated past psychiatric history, as of yet unable to get a hold of collateral (message left for ), who was BIB EMS from her domicile ([facility]) after Patient made suicidal statements to staff and has been behaving more bizarrely in the last two weeks (described as "eccentric" as baseline; now appearing thinner, has many cuts/bruises, wearing diapers, making suicidal statements that she will kill herself when she runs out of money, and telling [facility] staff that "she will miss them"). In the ED, Patient was verbally agitated, guarded, tangential, uncooperative / refusing recommended procedures, making bizarre statements and demanding to leave. As per [facility] staff, Pt's estranged  and friend have been frequently calling the [facility] to ask if Patient was still alive. Patient was deemed an imminent risk of harm to self and admitted on a 9.39.
Patient is a 47 y/o  F, in the process of divorce, has a 18 y/o son who lives with her ex-, unemployed, domiciled at [facility], with no reported formal PPhx, denies hx of inpt hospitalizations, denies suicide attempts, denies hx of violence/legal problems, denies substance use, and Pmhx significant for hypothyroidism and left ankle pain. Patient presents today brought in by EMS after hotel staff activated 911 due to pt making suicidal statements. On arrival to ED pt has been verbally agitated, guarded, tangential, refusing recommended procedures, making bizarre statements and demanding to leave. On evaluation pt denies suicidal ideation, however she is guarded and minimizing of statements made to hotel staff. Per hotel staff pt has been decompensating for the past few months, appearing thinner, has many cuts/bruises, wearing diapers, making suicidal statements that she will kill herself when she runs out of money, and telling hotel staff that "she will miss them". Pt's ex- and BF have been frequently calling the hotel to make sure pt is alive. Patient is unable to engage in full evaluation or safety planning while in the ED. At this time pt is at high imminent risk of harm and requires inpt hospitalization for safety and stabilization.  Adjusting to unit.  More cooperative.  Denies any SI.  TSH=230.40

## 2018-11-26 NOTE — PROGRESS NOTE BEHAVIORAL HEALTH - IMPULSE CONTROL
Other
Normal
Other
Other
Normal
Other
Normal
Normal
Other
Normal
Other
Normal

## 2020-02-08 NOTE — PROGRESS NOTE BEHAVIORAL HEALTH - NSBHFUPADDINFO_PSY_A_CORE
Past Medical History:   Diagnosis Date    Anticoagulant long-term use     Antiphospholipid antibody positive     Arthritis     Chest pain 2018    Devic's syndrome 2017    Encounter for blood transfusion     Positive LETICIA (antinuclear antibody)     Positive double stranded DNA antibody test     Pseudotumor cerebri     Seizures     SLE (systemic lupus erythematosus)     Stroke 6/10/10    see MRI 6/10/10       Past Surgical History:   Procedure Laterality Date    CERVICAL CERCLAGE       SECTION      DILATION AND CURETTAGE OF UTERUS      ESOPHAGOGASTRODUODENOSCOPY N/A 10/23/2018    Procedure: EGD (ESOPHAGOGASTRODUODENOSCOPY);  Surgeon: Hina Pyle MD;  Location: [facility] ([address] [place] [address]);  Service: Endoscopy;  Laterality: N/A;    HARDWARE REMOVAL Right 2018    Procedure: REMOVAL, HARDWARE;  Surgeon: Jose Maria Palomares MD;  Location: [facility] OR [address] [place] [address];  Service: Orthopedics;  Laterality: Right;    none         Immunization History   Administered Date(s) Administered    PPD Test 2018    Tdap 2018       Review of patient's allergies indicates:   Allergen Reactions    Pneumococcal 23-adalgisa ps vaccine     Vancomycin analogues Other (See Comments) and Blisters    Bactrim [sulfamethoxazole-trimethoprim] Rash    Ciprofloxacin Rash     Current Facility-Administered Medications   Medication Frequency    0.9%  NaCl infusion Continuous    acetaminophen tablet 500 mg Q6H PRN    acetaZOLAMIDE tablet 250 mg BID    baclofen tablet 10 mg BID    [START ON 2020] ceFEPIme injection 1 g Q24H    colchicine split tablet 0.3 mg BID    enoxaparin injection 80 mg Q12H    gabapentin capsule 900 mg Q8H    indomethacin capsule 25 mg TID WM    [START ON 2020] megestrol tablet 40 mg TID AC    miconazole 2 % cream BID    ondansetron disintegrating tablet 8 mg Q8H PRN    ondansetron injection 4 mg Q12H PRN    oxyCODONE immediate release tablet 10 mg Q6H PRN    [START ON 2020]  pantoprazole EC tablet 40 mg Daily    predniSONE tablet 10 mg Daily    sodium chloride 0.9% flush 5 mL PRN    triamcinolone acetonide 0.1% ointment BID     Family History     Problem Relation (Age of Onset)    Cancer Father, Paternal Grandfather    Diabetes Mellitus Mother, Maternal Grandfather    Heart disease Maternal Grandfather    Hypertension Mother, Maternal Grandfather    Lupus Paternal Aunt        Tobacco Use    Smoking status: Former Smoker     Years: 0.00     Types: Cigarettes     Last attempt to quit: 2018     Years since quittin.2    Smokeless tobacco: Never Used    Tobacco comment: CIGAR USER, 1 CIGAR A DAY   Substance and Sexual Activity    Alcohol use: No     Alcohol/week: 2.0 standard drinks     Types: 1 Glasses of wine, 1 Shots of liquor per week     Comment: Last drink over few years ago    Drug use: Yes     Types: Marijuana     Comment: poor appetite    Sexual activity: Not Currently     Partners: Male     Review of Systems   Constitutional: Negative for chills and fever.   HENT: Negative for mouth sores.    Eyes: Negative for photophobia, pain and redness.   Respiratory: Negative for cough and shortness of breath.    Cardiovascular: Positive for chest pain.   Skin: Negative for rash.   Neurological: Negative for headaches.   Psychiatric/Behavioral: Positive for agitation.     Objective:     Vital Signs (Most Recent):  Temp: 100.2 °F (37.9 °C) (20)  Pulse: 110 (20)  Resp: 11 (20)  BP: 133/75(Simultaneous filing. User may not have seen previous data.) (20)  SpO2: 98 % (20)  O2 Device (Oxygen Therapy): nasal cannula (20) Vital Signs (24h Range):  Temp:  [98.8 °F (37.1 °C)-100.2 °F (37.9 °C)] 100.2 °F (37.9 °C)  Pulse:  [108-133] 110  Resp:  [11-20] 11  SpO2:  [91 %-99 %] 98 %  BP: (108-161)/(62-92) 133/75     Weight: 84.4 kg (186 lb) (20)  Body mass index is 31.93 kg/m².  Body surface area is 1.95 meters  squared.    No intake or output data in the 24 hours ending 02/07/20 1933    Physical Exam   Constitutional: She is oriented to person, place, and time and well-developed, well-nourished, and in no distress. No distress.   Slowed cognition    HENT:   Mouth/Throat: Oropharyngeal exudate present.   Eyes: Conjunctivae are normal. Pupils are equal, round, and reactive to light. No scleral icterus.   Neck: Normal range of motion. Neck supple.   Cardiovascular: Normal rate and regular rhythm.    No murmur heard.  Pulmonary/Chest: Effort normal and breath sounds normal.   On 2L NC  Crackles in bases   Abdominal: Soft. She exhibits no distension. There is no tenderness.   Lymphadenopathy:     She has no cervical adenopathy.   Neurological: She is alert and oriented to person, place, and time.   Slowed cognition    Skin: Skin is warm and dry. She is not diaphoretic. No erythema.     +discoid lesions (non active)   alopecia (old)   Musculoskeletal: Normal range of motion. She exhibits no edema.   No large or small joint synovitis          Significant Labs:  BMP:   Recent Labs   Lab 02/07/20  1337   GLU 84      K 4.6      CO2 20*   BUN 14   CREATININE 1.2   CALCIUM 8.6*   MG 1.9     CBC:   Recent Labs   Lab 02/07/20  1337   WBC 7.88   HGB 7.1*   HCT 26.8*        CMP:   Recent Labs   Lab 02/07/20  1337   GLU 84   CALCIUM 8.6*   ALBUMIN 1.7*   PROT 9.4*      K 4.6   CO2 20*      BUN 14   CREATININE 1.2   ALKPHOS 57   ALT <5*   AST 9*   BILITOT 0.5       Significant Imaging:  Imaging results within the past 24 hours have been reviewed.   yes, from personal collateral/yes, from patient

## 2020-09-30 NOTE — PROGRESS NOTE BEHAVIORAL HEALTH - [facility]HT [employer]
Normal

## 2021-05-10 ENCOUNTER — APPOINTMENT (OUTPATIENT)
Dept: DISASTER EMERGENCY | Facility: OTHER | Age: 51
End: 2021-05-10
Payer: COMMERCIAL

## 2021-05-10 PROCEDURE — 0012A: CPT

## 2024-12-22 NOTE — PROGRESS NOTE BEHAVIORAL HEALTH - NSBHADMITIPOBSFT_PSY_A_CORE
1.  Conjunctival chemosis Dino Mcknight. Stable on PRED qd do not stop IOP higher hopefully will come down on qd PRED2. Epiphora OS - s/p DCRs/p multiple lid surgeries with good lid closure now. 3. Reviewed ABMD mild and not affecting vision or symptoms monitor. 4. Hypertropia Exotropia OS: s/p muscle surgery with small residual stable with prism in glasses. secondary to TED5. Pseudophakia OU - IOLs stable. MonitorReturn for an appointment in 2 month for pressure check. with Dr. Juan Jose Collado. no has been ok thus far